# Patient Record
Sex: FEMALE | Race: WHITE | NOT HISPANIC OR LATINO
[De-identification: names, ages, dates, MRNs, and addresses within clinical notes are randomized per-mention and may not be internally consistent; named-entity substitution may affect disease eponyms.]

---

## 2017-03-14 ENCOUNTER — APPOINTMENT (OUTPATIENT)
Dept: OTOLARYNGOLOGY | Facility: AMBULATORY SURGERY CENTER | Age: 27
End: 2017-03-14

## 2019-02-05 ENCOUNTER — APPOINTMENT (OUTPATIENT)
Dept: OTOLARYNGOLOGY | Facility: CLINIC | Age: 29
End: 2019-02-05
Payer: COMMERCIAL

## 2019-02-05 VITALS
TEMPERATURE: 98.2 F | DIASTOLIC BLOOD PRESSURE: 60 MMHG | RESPIRATION RATE: 20 BRPM | SYSTOLIC BLOOD PRESSURE: 97 MMHG | OXYGEN SATURATION: 100 % | HEART RATE: 86 BPM

## 2019-02-05 PROCEDURE — 31231 NASAL ENDOSCOPY DX: CPT

## 2019-02-05 PROCEDURE — 99214 OFFICE O/P EST MOD 30 MIN: CPT | Mod: 25

## 2019-02-05 RX ORDER — OMEPRAZOLE 40 MG/1
40 CAPSULE, DELAYED RELEASE ORAL
Qty: 7 | Refills: 0 | Status: ACTIVE | COMMUNITY
Start: 2019-02-05 | End: 1900-01-01

## 2019-02-05 RX ORDER — METHYLPREDNISOLONE 4 MG/1
4 TABLET ORAL
Qty: 1 | Refills: 0 | Status: ACTIVE | COMMUNITY
Start: 2019-02-05 | End: 1900-01-01

## 2019-02-05 NOTE — PHYSICAL EXAM
[Nasal Endoscopy Performed] : nasal endoscopy was performed, see procedure section for findings [de-identified] : red [de-identified] : anteriorly,m but couldnot see posteriorly with speculum [Midline] : trachea located in midline position [Normal] : no rashes

## 2019-02-05 NOTE — HISTORY OF PRESENT ILLNESS
[de-identified] : 30 yo woman who has had fess with me in the past and h/o asthma and recurrent nasal polyps irrigates nose bid and has had episodic epsitaxis nasal congestion crusting and drainage for a few weeks./ -f.s.c feels as it did preop. Wants a new allergist. The drainage can be bloody and also yellow. it is severe.no fh related to cc

## 2019-02-05 NOTE — PROCEDURE
[Osteomeatal Pathology] : osteomeatal pathology [Epistaxis] : evaluation of epistaxis [Anterior rhinoscopy insufficient to account for symptoms] : anterior rhinoscopy insufficient to account for symptoms [Topical Lidocaine] : topical lidocaine [Oxymetazoline HCl] : oxymetazoline HCl [Flexible Endoscope] : examined with the flexible endoscope [Serial Number: ___] : Serial Number: [unfilled] [Audrey] : audrey [Red] : red [Nasal Mucosa] : bilateral purulence [Normal] : the septum showed no abnormalities [Paranasal Sinuses Middle Meatus] : bilateral purulence [___ cm] : bilateral [unfilled]Ucm polyp(s) [FreeTextEntry6] : yellow omu drainage with polyps in omus b

## 2019-02-21 ENCOUNTER — APPOINTMENT (OUTPATIENT)
Dept: OTOLARYNGOLOGY | Facility: CLINIC | Age: 29
End: 2019-02-21

## 2019-10-27 ENCOUNTER — FORM ENCOUNTER (OUTPATIENT)
Age: 29
End: 2019-10-27

## 2019-10-28 ENCOUNTER — FORM ENCOUNTER (OUTPATIENT)
Age: 29
End: 2019-10-28

## 2019-10-28 ENCOUNTER — APPOINTMENT (OUTPATIENT)
Dept: OBGYN | Facility: CLINIC | Age: 29
End: 2019-10-28
Payer: COMMERCIAL

## 2019-10-28 PROCEDURE — 36415 COLL VENOUS BLD VENIPUNCTURE: CPT

## 2019-10-28 PROCEDURE — 99202 OFFICE O/P NEW SF 15 MIN: CPT

## 2020-05-29 ENCOUNTER — APPOINTMENT (OUTPATIENT)
Dept: OBGYN | Facility: CLINIC | Age: 30
End: 2020-05-29
Payer: COMMERCIAL

## 2020-05-29 PROCEDURE — 99213 OFFICE O/P EST LOW 20 MIN: CPT

## 2020-06-22 ENCOUNTER — TRANSCRIPTION ENCOUNTER (OUTPATIENT)
Age: 30
End: 2020-06-22

## 2020-10-16 ENCOUNTER — APPOINTMENT (OUTPATIENT)
Dept: OBGYN | Facility: CLINIC | Age: 30
End: 2020-10-16
Payer: COMMERCIAL

## 2020-10-16 PROCEDURE — 99213 OFFICE O/P EST LOW 20 MIN: CPT

## 2020-10-30 ENCOUNTER — TRANSCRIPTION ENCOUNTER (OUTPATIENT)
Age: 30
End: 2020-10-30

## 2020-11-18 ENCOUNTER — TRANSCRIPTION ENCOUNTER (OUTPATIENT)
Age: 30
End: 2020-11-18

## 2020-12-08 ENCOUNTER — FORM ENCOUNTER (OUTPATIENT)
Age: 30
End: 2020-12-08

## 2020-12-08 ENCOUNTER — APPOINTMENT (OUTPATIENT)
Dept: OBGYN | Facility: CLINIC | Age: 30
End: 2020-12-08
Payer: COMMERCIAL

## 2020-12-08 PROCEDURE — 99212 OFFICE O/P EST SF 10 MIN: CPT | Mod: 95

## 2021-01-11 ENCOUNTER — FORM ENCOUNTER (OUTPATIENT)
Age: 31
End: 2021-01-11

## 2021-01-12 ENCOUNTER — APPOINTMENT (OUTPATIENT)
Dept: OBGYN | Facility: CLINIC | Age: 31
End: 2021-01-12
Payer: COMMERCIAL

## 2021-01-12 PROCEDURE — 76817 TRANSVAGINAL US OBSTETRIC: CPT

## 2021-01-12 PROCEDURE — 36415 COLL VENOUS BLD VENIPUNCTURE: CPT

## 2021-01-12 PROCEDURE — 99072 ADDL SUPL MATRL&STAF TM PHE: CPT

## 2021-01-12 PROCEDURE — 99213 OFFICE O/P EST LOW 20 MIN: CPT | Mod: 25

## 2021-01-12 PROCEDURE — 99395 PREV VISIT EST AGE 18-39: CPT

## 2021-01-28 ENCOUNTER — FORM ENCOUNTER (OUTPATIENT)
Age: 31
End: 2021-01-28

## 2021-02-09 ENCOUNTER — FORM ENCOUNTER (OUTPATIENT)
Age: 31
End: 2021-02-09

## 2021-02-10 ENCOUNTER — APPOINTMENT (OUTPATIENT)
Dept: OBGYN | Facility: CLINIC | Age: 31
End: 2021-02-10
Payer: COMMERCIAL

## 2021-02-10 PROCEDURE — 99072 ADDL SUPL MATRL&STAF TM PHE: CPT

## 2021-02-10 PROCEDURE — 76813 OB US NUCHAL MEAS 1 GEST: CPT

## 2021-02-10 PROCEDURE — 36415 COLL VENOUS BLD VENIPUNCTURE: CPT

## 2021-02-10 PROCEDURE — 0500F INITIAL PRENATAL CARE VISIT: CPT

## 2021-02-10 PROCEDURE — 76801 OB US < 14 WKS SINGLE FETUS: CPT | Mod: 59

## 2021-02-12 ENCOUNTER — FORM ENCOUNTER (OUTPATIENT)
Age: 31
End: 2021-02-12

## 2021-02-21 ENCOUNTER — FORM ENCOUNTER (OUTPATIENT)
Age: 31
End: 2021-02-21

## 2021-02-23 ENCOUNTER — OUTPATIENT (OUTPATIENT)
Dept: OUTPATIENT SERVICES | Facility: HOSPITAL | Age: 31
LOS: 1 days | Discharge: HOME | End: 2021-02-23
Payer: SELF-PAY

## 2021-02-24 PROCEDURE — 90792 PSYCH DIAG EVAL W/MED SRVCS: CPT | Mod: 95

## 2021-03-08 ENCOUNTER — APPOINTMENT (OUTPATIENT)
Dept: OBGYN | Facility: CLINIC | Age: 31
End: 2021-03-08
Payer: COMMERCIAL

## 2021-03-08 PROCEDURE — 36415 COLL VENOUS BLD VENIPUNCTURE: CPT

## 2021-03-08 PROCEDURE — 0502F SUBSEQUENT PRENATAL CARE: CPT

## 2021-03-16 PROCEDURE — 99214 OFFICE O/P EST MOD 30 MIN: CPT | Mod: 95

## 2021-04-02 ENCOUNTER — NON-APPOINTMENT (OUTPATIENT)
Age: 31
End: 2021-04-02

## 2021-04-02 ENCOUNTER — APPOINTMENT (OUTPATIENT)
Dept: ANTEPARTUM | Facility: CLINIC | Age: 31
End: 2021-04-02
Payer: COMMERCIAL

## 2021-04-02 ENCOUNTER — ASOB RESULT (OUTPATIENT)
Age: 31
End: 2021-04-02

## 2021-04-02 PROCEDURE — 76811 OB US DETAILED SNGL FETUS: CPT

## 2021-04-02 PROCEDURE — 99072 ADDL SUPL MATRL&STAF TM PHE: CPT

## 2021-04-06 ENCOUNTER — APPOINTMENT (OUTPATIENT)
Dept: OBGYN | Facility: CLINIC | Age: 31
End: 2021-04-06
Payer: COMMERCIAL

## 2021-04-06 DIAGNOSIS — F41.9 ANXIETY DISORDER, UNSPECIFIED: ICD-10-CM

## 2021-04-06 DIAGNOSIS — F31.81 BIPOLAR II DISORDER: ICD-10-CM

## 2021-04-06 PROCEDURE — 0502F SUBSEQUENT PRENATAL CARE: CPT

## 2021-04-07 PROCEDURE — 99214 OFFICE O/P EST MOD 30 MIN: CPT | Mod: 95

## 2021-04-16 DIAGNOSIS — F31.9 BIPOLAR DISORDER, UNSPECIFIED: ICD-10-CM

## 2021-05-05 PROCEDURE — 99214 OFFICE O/P EST MOD 30 MIN: CPT | Mod: 95

## 2021-05-18 ENCOUNTER — APPOINTMENT (OUTPATIENT)
Dept: OBGYN | Facility: CLINIC | Age: 31
End: 2021-05-18
Payer: COMMERCIAL

## 2021-05-18 ENCOUNTER — FORM ENCOUNTER (OUTPATIENT)
Age: 31
End: 2021-05-18

## 2021-05-18 PROCEDURE — 36415 COLL VENOUS BLD VENIPUNCTURE: CPT

## 2021-05-18 PROCEDURE — 0502F SUBSEQUENT PRENATAL CARE: CPT

## 2021-06-08 PROCEDURE — 99214 OFFICE O/P EST MOD 30 MIN: CPT | Mod: 95

## 2021-06-14 ENCOUNTER — APPOINTMENT (OUTPATIENT)
Dept: OBGYN | Facility: CLINIC | Age: 31
End: 2021-06-14
Payer: COMMERCIAL

## 2021-06-14 PROCEDURE — 0502F SUBSEQUENT PRENATAL CARE: CPT

## 2021-06-28 ENCOUNTER — FORM ENCOUNTER (OUTPATIENT)
Age: 31
End: 2021-06-28

## 2021-06-29 ENCOUNTER — APPOINTMENT (OUTPATIENT)
Dept: OBGYN | Facility: CLINIC | Age: 31
End: 2021-06-29
Payer: COMMERCIAL

## 2021-06-29 PROCEDURE — 76816 OB US FOLLOW-UP PER FETUS: CPT

## 2021-06-29 PROCEDURE — 90471 IMMUNIZATION ADMIN: CPT

## 2021-06-29 PROCEDURE — 99072 ADDL SUPL MATRL&STAF TM PHE: CPT

## 2021-06-29 PROCEDURE — 90715 TDAP VACCINE 7 YRS/> IM: CPT

## 2021-06-29 PROCEDURE — 76819 FETAL BIOPHYS PROFIL W/O NST: CPT

## 2021-07-06 PROCEDURE — 99214 OFFICE O/P EST MOD 30 MIN: CPT | Mod: 95

## 2021-07-09 PROCEDURE — 90792 PSYCH DIAG EVAL W/MED SRVCS: CPT | Mod: 95

## 2021-07-19 ENCOUNTER — APPOINTMENT (OUTPATIENT)
Dept: OBGYN | Facility: CLINIC | Age: 31
End: 2021-07-19
Payer: COMMERCIAL

## 2021-07-19 PROCEDURE — 0502F SUBSEQUENT PRENATAL CARE: CPT

## 2021-08-02 ENCOUNTER — APPOINTMENT (OUTPATIENT)
Dept: OBGYN | Facility: CLINIC | Age: 31
End: 2021-08-02
Payer: COMMERCIAL

## 2021-08-02 PROCEDURE — 0502F SUBSEQUENT PRENATAL CARE: CPT

## 2021-08-11 ENCOUNTER — FORM ENCOUNTER (OUTPATIENT)
Age: 31
End: 2021-08-11

## 2021-08-11 ENCOUNTER — APPOINTMENT (OUTPATIENT)
Dept: OBGYN | Facility: CLINIC | Age: 31
End: 2021-08-11

## 2021-08-12 ENCOUNTER — APPOINTMENT (OUTPATIENT)
Dept: OBGYN | Facility: CLINIC | Age: 31
End: 2021-08-12
Payer: COMMERCIAL

## 2021-08-12 PROCEDURE — 76819 FETAL BIOPHYS PROFIL W/O NST: CPT

## 2021-08-12 PROCEDURE — 36415 COLL VENOUS BLD VENIPUNCTURE: CPT

## 2021-08-12 PROCEDURE — 0502F SUBSEQUENT PRENATAL CARE: CPT

## 2021-08-12 PROCEDURE — 76816 OB US FOLLOW-UP PER FETUS: CPT

## 2021-08-16 ENCOUNTER — APPOINTMENT (OUTPATIENT)
Dept: OBGYN | Facility: CLINIC | Age: 31
End: 2021-08-16
Payer: COMMERCIAL

## 2021-08-16 PROCEDURE — 0502F SUBSEQUENT PRENATAL CARE: CPT

## 2021-08-17 ENCOUNTER — FORM ENCOUNTER (OUTPATIENT)
Age: 31
End: 2021-08-17

## 2021-08-21 ENCOUNTER — OUTPATIENT (OUTPATIENT)
Dept: OUTPATIENT SERVICES | Facility: HOSPITAL | Age: 31
LOS: 1 days | End: 2021-08-21
Payer: COMMERCIAL

## 2021-08-21 DIAGNOSIS — Z11.52 ENCOUNTER FOR SCREENING FOR COVID-19: ICD-10-CM

## 2021-08-21 LAB — SARS-COV-2 RNA SPEC QL NAA+PROBE: SIGNIFICANT CHANGE UP

## 2021-08-21 PROCEDURE — U0003: CPT

## 2021-08-21 PROCEDURE — C9803: CPT

## 2021-08-21 PROCEDURE — U0005: CPT

## 2021-08-22 ENCOUNTER — FORM ENCOUNTER (OUTPATIENT)
Age: 31
End: 2021-08-22

## 2021-08-23 ENCOUNTER — APPOINTMENT (OUTPATIENT)
Dept: OBGYN | Facility: CLINIC | Age: 31
End: 2021-08-23
Payer: COMMERCIAL

## 2021-08-23 PROCEDURE — 0502F SUBSEQUENT PRENATAL CARE: CPT

## 2021-08-23 PROCEDURE — 76818 FETAL BIOPHYS PROFILE W/NST: CPT

## 2021-08-24 ENCOUNTER — FORM ENCOUNTER (OUTPATIENT)
Age: 31
End: 2021-08-24

## 2021-08-24 ENCOUNTER — INPATIENT (INPATIENT)
Facility: HOSPITAL | Age: 31
LOS: 2 days | Discharge: HOME | End: 2021-08-27
Attending: STUDENT IN AN ORGANIZED HEALTH CARE EDUCATION/TRAINING PROGRAM | Admitting: STUDENT IN AN ORGANIZED HEALTH CARE EDUCATION/TRAINING PROGRAM
Payer: COMMERCIAL

## 2021-08-24 VITALS
DIASTOLIC BLOOD PRESSURE: 78 MMHG | HEART RATE: 100 BPM | WEIGHT: 138.01 LBS | RESPIRATION RATE: 17 BRPM | OXYGEN SATURATION: 100 % | HEIGHT: 60 IN | SYSTOLIC BLOOD PRESSURE: 112 MMHG | TEMPERATURE: 99 F

## 2021-08-24 DIAGNOSIS — O36.5930 MATERNAL CARE FOR OTHER KNOWN OR SUSPECTED POOR FETAL GROWTH, THIRD TRIMESTER, NOT APPLICABLE OR UNSPECIFIED: ICD-10-CM

## 2021-08-24 DIAGNOSIS — F31.9 BIPOLAR DISORDER, UNSPECIFIED: Chronic | ICD-10-CM

## 2021-08-24 DIAGNOSIS — Z98.890 OTHER SPECIFIED POSTPROCEDURAL STATES: Chronic | ICD-10-CM

## 2021-08-24 LAB
BASOPHILS # BLD AUTO: 0.05 K/UL — SIGNIFICANT CHANGE UP (ref 0–0.2)
BASOPHILS NFR BLD AUTO: 0.5 % — SIGNIFICANT CHANGE UP (ref 0–2)
BLD GP AB SCN SERPL QL: NEGATIVE — SIGNIFICANT CHANGE UP
EOSINOPHIL # BLD AUTO: 0.13 K/UL — SIGNIFICANT CHANGE UP (ref 0–0.5)
EOSINOPHIL NFR BLD AUTO: 1.3 % — SIGNIFICANT CHANGE UP (ref 0–6)
HCT VFR BLD CALC: 31.4 % — LOW (ref 34.5–45)
HGB BLD-MCNC: 10.8 G/DL — LOW (ref 11.5–15.5)
IMM GRANULOCYTES NFR BLD AUTO: 0.4 % — SIGNIFICANT CHANGE UP (ref 0–1.5)
LYMPHOCYTES # BLD AUTO: 2.1 K/UL — SIGNIFICANT CHANGE UP (ref 1–3.3)
LYMPHOCYTES # BLD AUTO: 20.9 % — SIGNIFICANT CHANGE UP (ref 13–44)
MCHC RBC-ENTMCNC: 31.3 PG — SIGNIFICANT CHANGE UP (ref 27–34)
MCHC RBC-ENTMCNC: 34.4 GM/DL — SIGNIFICANT CHANGE UP (ref 32–36)
MCV RBC AUTO: 91 FL — SIGNIFICANT CHANGE UP (ref 80–100)
MONOCYTES # BLD AUTO: 0.57 K/UL — SIGNIFICANT CHANGE UP (ref 0–0.9)
MONOCYTES NFR BLD AUTO: 5.7 % — SIGNIFICANT CHANGE UP (ref 2–14)
NEUTROPHILS # BLD AUTO: 7.16 K/UL — SIGNIFICANT CHANGE UP (ref 1.8–7.4)
NEUTROPHILS NFR BLD AUTO: 71.2 % — SIGNIFICANT CHANGE UP (ref 43–77)
NRBC # BLD: 0 /100 WBCS — SIGNIFICANT CHANGE UP (ref 0–0)
PLATELET # BLD AUTO: 319 K/UL — SIGNIFICANT CHANGE UP (ref 150–400)
RBC # BLD: 3.45 M/UL — LOW (ref 3.8–5.2)
RBC # FLD: 12.6 % — SIGNIFICANT CHANGE UP (ref 10.3–14.5)
RH IG SCN BLD-IMP: POSITIVE — SIGNIFICANT CHANGE UP
WBC # BLD: 10.05 K/UL — SIGNIFICANT CHANGE UP (ref 3.8–10.5)
WBC # FLD AUTO: 10.05 K/UL — SIGNIFICANT CHANGE UP (ref 3.8–10.5)

## 2021-08-24 RX ORDER — SODIUM CHLORIDE 9 MG/ML
1000 INJECTION, SOLUTION INTRAVENOUS
Refills: 0 | Status: DISCONTINUED | OUTPATIENT
Start: 2021-08-24 | End: 2021-08-25

## 2021-08-24 RX ORDER — SODIUM CHLORIDE 9 MG/ML
1000 INJECTION, SOLUTION INTRAVENOUS
Refills: 0 | Status: DISCONTINUED | OUTPATIENT
Start: 2021-08-24 | End: 2021-08-27

## 2021-08-24 RX ORDER — SODIUM CHLORIDE 9 MG/ML
1000 INJECTION, SOLUTION INTRAVENOUS ONCE
Refills: 0 | Status: COMPLETED | OUTPATIENT
Start: 2021-08-24 | End: 2021-08-24

## 2021-08-24 RX ORDER — CITRIC ACID/SODIUM CITRATE 300-500 MG
15 SOLUTION, ORAL ORAL EVERY 6 HOURS
Refills: 0 | Status: DISCONTINUED | OUTPATIENT
Start: 2021-08-24 | End: 2021-08-25

## 2021-08-24 RX ORDER — OXYTOCIN 10 UNIT/ML
333.33 VIAL (ML) INJECTION
Qty: 20 | Refills: 0 | Status: DISCONTINUED | OUTPATIENT
Start: 2021-08-24 | End: 2021-08-25

## 2021-08-24 RX ADMIN — SODIUM CHLORIDE 1000 MILLILITER(S): 9 INJECTION, SOLUTION INTRAVENOUS at 23:23

## 2021-08-24 RX ADMIN — SODIUM CHLORIDE 125 MILLILITER(S): 9 INJECTION, SOLUTION INTRAVENOUS at 23:23

## 2021-08-24 NOTE — OB PROVIDER H&P - NSICDXPASTMEDICALHX_GEN_ALL_CORE_FT
PAST MEDICAL HISTORY:  Asthma takes albuterol as needed last used 1 day ago    H/O Meniere's disease     Sinusitis

## 2021-08-24 NOTE — OB PROVIDER H&P - ASSESSMENT
30yo  @40+2 here for eIOL fidel q5-6 min. Admit to L&D and start on po cytotec and plan for CB.    Plan  - Admit to LND. Routine Labs. IVF.  - IOL w/ po cytotec. CB @2-3 am  - Fetus: cat 145 tracing. VTX. EFW extrapolated to 3200g by  sono. Continuous EFM. No concerns.  - Prenatal issues: none  - GBS neg, abx not required  - Pain: epidural PRN    Patient discussed with attending physician, Dr. Durand.

## 2021-08-24 NOTE — OB PROVIDER H&P - NS_OBGYNHISTORY_OBGYN_ALL_OB_FT
Ob: Primigravida  Gyn: Hx of ovarian cysts. Denies fibroids, PCOS, endometriosis, abnormal paps, STIs

## 2021-08-24 NOTE — OB RN PATIENT PROFILE - NSICDXPASTMEDICALHX_GEN_ALL_CORE_FT
PAST MEDICAL HISTORY:  Asthma takes albuterol as needed last attack 1 month ago    H/O Meniere's disease     Sinusitis

## 2021-08-24 NOTE — OB RN PATIENT PROFILE - NSICDXPASTSURGICALHX_GEN_ALL_CORE_FT
PAST SURGICAL HISTORY:  Bipolar disorder sees therapist with [facility], takes buproprion 100 mg XR and lamictal 150 mg once daily    H/O nasal polypectomy     Status post breast reduction

## 2021-08-24 NOTE — OB PROVIDER H&P - NS_CONSENTSAPP_OBGYN_ALL_OB
Anesthesia ROS/MED HX    Anesthesia History - neg  Pulmonary - neg  Neuro/Psych - neg  Cardiovascular- neg  Hematological - neg  GI/Hepatic- neg  Musculoskeletal- neg  Renal Disease- neg  Endo/Other  History of cancer and colon cancer  Body Habitus: Overweight      Past Surgical History:   Procedure Laterality Date   •  SECTION     • COLON SURGERY  05/15/2013    Colonoscopy to the base of the cecum. Limited ileoscopy, biopsy, flat area of the base of the cecum.    • COLON SURGERY  2015    Colonoscopy to the base of the cecum, normal anastomosis.   • COLON SURGERY  2011    Colonoscopy to the base of the cecum   • COLON SURGERY  07/15/2009    Colonoscopy to the base of the cecum   • COLON SURGERY  2008    Colonoscopy to the base of the cecum   • COLON SURGERY  2008    Exploratory laparoscopy with laparoscopic Tequila's resection, takedown of the splenic flexure, resection of residual sigmoid colon,portion of rectum with descending colorectal anastomosis.    • COLON SURGERY  2007    Total colonoscopy via stoma and flexible sigmoidoscopy per rectum with cold forceps biopsy.    • COLON SURGERY  2017    Colonoscopy to the base of the cecum       Physical Exam    Airway   Mallampati: III   TM distance: >3 FB   Neck ROM: full  Cardiovascular - normal   Rhythm: regular   Rate: normal  Pulmonary - normal   clear to auscultation        Anesthesia Plan    Plan: MAC   ASA 3     No

## 2021-08-24 NOTE — OB PROVIDER H&P - NSHPPHYSICALEXAM_GEN_ALL_CORE
Gen: NAD, very uncomfortable during contractions  CV: RRR  Pulm: breathing comfortably on RA  Abd: gravid, nontender  Extr: moving all extremities with ease  – VE: 1/80/-3  – FHT Cat I: baseline 145, mod variability, +accels, -decels  – [place]: q5-6 min  – Sono: vertex      No prenatal issues. GBS _.    Plan  - Admit to LND. Routine Labs. IVF.  - Expectant management/IOL w/  - Fetus: cat 1 tracing. VTX. EFW g by sono. Continuous EFM. No concerns.  - Prenatal issues: none  - GBS   - Pain: epidural PRN    Patient discussed with attending physician,  . Gen: NAD, very uncomfortable during contractions  CV: RRR  Pulm: breathing comfortably on RA  Abd: gravid, nontender  Extr: moving all extremities with ease  – VE: 1/80/-3  – FHT Cat I: baseline 145, mod variability, +accels, -decels  – [place]: q5-6 min  – Sono: vertex

## 2021-08-24 NOTE — OB PROVIDER H&P - HISTORY OF PRESENT ILLNESS
HPI: 32yo F  @40+2 presents for eIOL. +FM. -LOF. -CTXs. -VB. Pt denies any other concerns.    PNC: Denies prenatal issues. GBS neg.  EFW 2800g by  shivam.    OBHx: primigravida  GynHx: denies hx STIs, fibroids, polyps, cysts  PMH: Asthma-on albuterol, last used yesterday. Never hospitalized or intubated. Denies hx clotting or bleeding disorders, HTN, DM  PSH: breast reduction, sinus sgy  PFH: no hx congential disorders, bleeding/clotting disorders  Psych: Bipolar Disorder and depression  Social: denies etoh, smoking, drugs. Safe at home/in relationship.  Meds: PNV   Allergies: NKDA  Will accept blood transfusions? Yes       HPI: 32yo F  @40+2 presents for eIOL. +FM. -LOF. -CTXs. -VB. Pt denies any other concerns.    PNC: Denies prenatal issues. GBS neg.  EFW 2800g by  shivam.    OBHx: primigravida  GynHx: denies hx STIs, fibroids, polyps, cysts  PMH: Asthma-on albuterol, last used yesterday. Never hospitalized or intubated. Denies hx clotting or bleeding disorders, HTN, DM  PSH: breast reduction, sinus sgy  PFH: no hx congential disorders, bleeding/clotting disorders  Psych: Bipolar Disorder and depression  Social: denies etoh, smoking, drugs. Safe at home/in relationship.  Meds: PNV   Allergies: NKDA  Will accept blood transfusions? Yes HPI: 30yo F  @40+2 presents for eIOL. +FM. -LOF. -VB. Pt denies any other concerns.    PNC: Denies prenatal issues. GBS neg.  EFW 2800g by  sono.    OBHx: primigravida  GynHx: Had ovarian cysts. Denies hx STIs, fibroids, PCOS, endometriosis, polyps, STIs, abnormal paps  PMH: Asthma-on albuterol, last used yesterday. Never hospitalized or intubated. Meniere's disease, HA. Denies hx clotting or bleeding disorders, HTN, DM  PSH: breast reduction, sinus sgy  PFH: no hx congential disorders, bleeding/clotting disorders  Psych: Bipolar Disorder and depression  Social: denies etoh, smoking, drugs. Safe at home/in relationship.  Meds: Lamictal 150mg qd, Bupropion 150 ER qd, PNV, Unasom, Mg  Allergies: NKDA  Will accept blood transfusions? Yes HPI: 32yo F  @40+2 presents for eIOL. +FM. -LOF. -VB. Pt denies any other concerns.    PNC: Denies prenatal issues. GBS neg.  EFW 2800g by  sono.    Patient has a history of Bipolar Disorder and takes Lamictal 150 and a history of depression and takes Bupropion 150 ER. She follows with Dr[facility] Renee at [facility].    OBHx: primigravida  GynHx: Had ovarian cysts. Denies hx STIs, fibroids, PCOS, endometriosis, polyps, STIs, abnormal paps  PMH: Asthma-on albuterol, last used yesterday. Never hospitalized or intubated. Meniere's disease, HA. Denies hx clotting or bleeding disorders, HTN, DM  PSH: breast reduction, sinus sgy  PFH: no hx congential disorders, bleeding/clotting disorders  Psych: Bipolar Disorder and depression.  Social: denies etoh, smoking, drugs. Safe at home/in relationship.  Meds: Lamictal 150mg qd, Bupropion 150 ER qd, PNV, Unasom, Mg  Allergies: NKDA  Will accept blood transfusions? Yes

## 2021-08-25 ENCOUNTER — FORM ENCOUNTER (OUTPATIENT)
Age: 31
End: 2021-08-25

## 2021-08-25 LAB
COVID-19 SPIKE DOMAIN AB INTERP: POSITIVE
COVID-19 SPIKE DOMAIN ANTIBODY RESULT: >250 U/ML — HIGH
SARS-COV-2 IGG+IGM SERPL QL IA: >250 U/ML — HIGH
SARS-COV-2 IGG+IGM SERPL QL IA: POSITIVE
T PALLIDUM AB TITR SER: NEGATIVE — SIGNIFICANT CHANGE UP

## 2021-08-25 PROCEDURE — 59400 OBSTETRICAL CARE: CPT

## 2021-08-25 RX ORDER — IBUPROFEN 200 MG
600 TABLET ORAL EVERY 6 HOURS
Refills: 0 | Status: DISCONTINUED | OUTPATIENT
Start: 2021-08-25 | End: 2021-08-25

## 2021-08-25 RX ORDER — DIBUCAINE 1 %
1 OINTMENT (GRAM) RECTAL EVERY 6 HOURS
Refills: 0 | Status: DISCONTINUED | OUTPATIENT
Start: 2021-08-25 | End: 2021-08-27

## 2021-08-25 RX ORDER — MAGNESIUM HYDROXIDE 400 MG/1
30 TABLET, CHEWABLE ORAL
Refills: 0 | Status: DISCONTINUED | OUTPATIENT
Start: 2021-08-25 | End: 2021-08-27

## 2021-08-25 RX ORDER — OXYTOCIN 10 UNIT/ML
333.33 VIAL (ML) INJECTION
Qty: 20 | Refills: 0 | Status: DISCONTINUED | OUTPATIENT
Start: 2021-08-25 | End: 2021-08-27

## 2021-08-25 RX ORDER — OXYTOCIN 10 UNIT/ML
4 VIAL (ML) INJECTION
Qty: 30 | Refills: 0 | Status: DISCONTINUED | OUTPATIENT
Start: 2021-08-25 | End: 2021-08-27

## 2021-08-25 RX ORDER — DIPHENHYDRAMINE HCL 50 MG
25 CAPSULE ORAL EVERY 6 HOURS
Refills: 0 | Status: DISCONTINUED | OUTPATIENT
Start: 2021-08-25 | End: 2021-08-27

## 2021-08-25 RX ORDER — HYDROCORTISONE 1 %
1 OINTMENT (GRAM) TOPICAL EVERY 6 HOURS
Refills: 0 | Status: DISCONTINUED | OUTPATIENT
Start: 2021-08-25 | End: 2021-08-27

## 2021-08-25 RX ORDER — TETANUS TOXOID, REDUCED DIPHTHERIA TOXOID AND ACELLULAR PERTUSSIS VACCINE, ADSORBED 5; 2.5; 8; 8; 2.5 [IU]/.5ML; [IU]/.5ML; UG/.5ML; UG/.5ML; UG/.5ML
0.5 SUSPENSION INTRAMUSCULAR ONCE
Refills: 0 | Status: DISCONTINUED | OUTPATIENT
Start: 2021-08-25 | End: 2021-08-27

## 2021-08-25 RX ORDER — BENZOCAINE 10 %
1 GEL (GRAM) MUCOUS MEMBRANE EVERY 6 HOURS
Refills: 0 | Status: DISCONTINUED | OUTPATIENT
Start: 2021-08-25 | End: 2021-08-27

## 2021-08-25 RX ORDER — AER TRAVELER 0.5 G/1
1 SOLUTION RECTAL; TOPICAL EVERY 4 HOURS
Refills: 0 | Status: DISCONTINUED | OUTPATIENT
Start: 2021-08-25 | End: 2021-08-27

## 2021-08-25 RX ORDER — OXYCODONE HYDROCHLORIDE 5 MG/1
5 TABLET ORAL ONCE
Refills: 0 | Status: DISCONTINUED | OUTPATIENT
Start: 2021-08-25 | End: 2021-08-27

## 2021-08-25 RX ORDER — OXYCODONE HYDROCHLORIDE 5 MG/1
5 TABLET ORAL
Refills: 0 | Status: DISCONTINUED | OUTPATIENT
Start: 2021-08-25 | End: 2021-08-27

## 2021-08-25 RX ORDER — SIMETHICONE 80 MG/1
80 TABLET, CHEWABLE ORAL EVERY 4 HOURS
Refills: 0 | Status: DISCONTINUED | OUTPATIENT
Start: 2021-08-25 | End: 2021-08-27

## 2021-08-25 RX ORDER — SODIUM CHLORIDE 9 MG/ML
3 INJECTION INTRAMUSCULAR; INTRAVENOUS; SUBCUTANEOUS EVERY 8 HOURS
Refills: 0 | Status: DISCONTINUED | OUTPATIENT
Start: 2021-08-25 | End: 2021-08-27

## 2021-08-25 RX ORDER — PRAMOXINE HYDROCHLORIDE 150 MG/15G
1 AEROSOL, FOAM RECTAL EVERY 4 HOURS
Refills: 0 | Status: DISCONTINUED | OUTPATIENT
Start: 2021-08-25 | End: 2021-08-27

## 2021-08-25 RX ORDER — IBUPROFEN 200 MG
600 TABLET ORAL EVERY 6 HOURS
Refills: 0 | Status: DISCONTINUED | OUTPATIENT
Start: 2021-08-25 | End: 2021-08-27

## 2021-08-25 RX ORDER — ACETAMINOPHEN 500 MG
975 TABLET ORAL
Refills: 0 | Status: DISCONTINUED | OUTPATIENT
Start: 2021-08-25 | End: 2021-08-27

## 2021-08-25 RX ORDER — LAMOTRIGINE 25 MG/1
150 TABLET, ORALLY DISINTEGRATING ORAL DAILY
Refills: 0 | Status: DISCONTINUED | OUTPATIENT
Start: 2021-08-25 | End: 2021-08-27

## 2021-08-25 RX ORDER — KETOROLAC TROMETHAMINE 30 MG/ML
30 SYRINGE (ML) INJECTION ONCE
Refills: 0 | Status: DISCONTINUED | OUTPATIENT
Start: 2021-08-25 | End: 2021-08-25

## 2021-08-25 RX ORDER — BUPROPION HYDROCHLORIDE 150 MG/1
150 TABLET, EXTENDED RELEASE ORAL DAILY
Refills: 0 | Status: DISCONTINUED | OUTPATIENT
Start: 2021-08-25 | End: 2021-08-27

## 2021-08-25 RX ORDER — ALBUTEROL 90 UG/1
2 AEROSOL, METERED ORAL EVERY 6 HOURS
Refills: 0 | Status: DISCONTINUED | OUTPATIENT
Start: 2021-08-25 | End: 2021-08-27

## 2021-08-25 RX ORDER — LANOLIN
1 OINTMENT (GRAM) TOPICAL EVERY 6 HOURS
Refills: 0 | Status: DISCONTINUED | OUTPATIENT
Start: 2021-08-25 | End: 2021-08-27

## 2021-08-25 RX ADMIN — BUPROPION HYDROCHLORIDE 150 MILLIGRAM(S): 150 TABLET, EXTENDED RELEASE ORAL at 08:49

## 2021-08-25 RX ADMIN — LAMOTRIGINE 150 MILLIGRAM(S): 25 TABLET, ORALLY DISINTEGRATING ORAL at 08:49

## 2021-08-25 RX ADMIN — Medication 30 MILLIGRAM(S): at 15:57

## 2021-08-25 RX ADMIN — Medication 975 MILLIGRAM(S): at 21:45

## 2021-08-25 RX ADMIN — Medication 4 MILLIUNIT(S)/MIN: at 08:38

## 2021-08-25 RX ADMIN — Medication 975 MILLIGRAM(S): at 21:11

## 2021-08-25 RX ADMIN — Medication 1000 MILLIUNIT(S)/MIN: at 16:25

## 2021-08-25 NOTE — OB PROVIDER LABOR PROGRESS NOTE - NS_SUBJECTIVE/OBJECTIVE_OBGYN_ALL_OB_FT
PA Note:  Patient seen and evaluated at bedside for placement of cervical balloon. Patient comfortable with epidural in place.    Cervical balloon placed without incident. 60cc of NS filled each uterine and vaginal balloon
Pt uncomfortable, desires more pain medication. Pt examined, Cook balloon out

## 2021-08-25 NOTE — OB PROVIDER DELIVERY SUMMARY - NSPROVIDERDELIVERYNOTE_OBGYN_ALL_OB_FT
Spontaneous vaginal delivery of liveborn infant from [place]. Head, shoulders and body delivered with the aid of a medline episiotomy. Infant was suctioned and handed off to mother/peds. Placenta delivered intact with a 3 vessel cord. Fundal massage was given and uterine fundus was found to be firm. Midline episiotomy was repaired with a 2.0 chromic. Skin was reapproximated with 3.0 chromic. Vaginal exam revealed an intact cervix, vaginal walls and sulci.  Excellent hemostasis noted. Patient was stable. Count was correct x2. APGAR 9/9. Spontaneous vaginal delivery of liveborn infant from [place]. Head, shoulders and body delivered with the aid of a midline episiotomy. Infant was suctioned and handed off to mother, delayed cord clamping performed after 1 minute then infant handed to peds. Placenta delivered intact with a 3 vessel cord. Fundal massage was given and uterine fundus was found to be firm. Midline episiotomy was repaired with a 2.0 chromic. Skin was reapproximated with 3.0 chromic. Vaginal exam revealed an intact cervix, vaginal walls and sulci.  Excellent hemostasis noted. Patient was stable. Count was correct x2. APGAR 9/9.

## 2021-08-25 NOTE — OB PROVIDER DELIVERY SUMMARY - NSSELHIDDEN_OBGYN_ALL_OB_FT
[NS_DeliveryAttending1_OBGYN_ALL_OB_FT:[id number]==],[NS_DeliveryAssist1_OBGYN_ALL_OB_FT:[id number]]

## 2021-08-25 NOTE — OB PROVIDER LABOR PROGRESS NOTE - ASSESSMENT
32yo  @40+2  eIOL.   Anesthesia called for top-off  C/W Pitocin for IOL  Will update Dr. Durand
A/P:  -EFM/[place]  -Continue IOL with CB and PO  -Anticipate   Plan per Dr. Ladarius Guardado PA-C

## 2021-08-25 NOTE — OB RN DELIVERY SUMMARY - NSSELHIDDEN_OBGYN_ALL_OB_FT
[NS_DeliveryAttending1_OBGYN_ALL_OB_FT:[id number]==],[NS_DeliveryAssist1_OBGYN_ALL_OB_FT:[id number]=],[NS_DeliveryRN_OBGYN_ALL_OB_FT:[id number]==]

## 2021-08-25 NOTE — OB RN DELIVERY SUMMARY - NS_SEPSISRSKCALC_OBGYN_ALL_OB_FT
EOS calculated successfully. EOS Risk Factor: 0.5/1000 live births ([facility] national incidence); GA=40w3d; Temp=98.8; ROM=4.867; GBS='Negative'; Antibiotics='No antibiotics or any antibiotics < 2 hrs prior to birth'

## 2021-08-26 ENCOUNTER — TRANSCRIPTION ENCOUNTER (OUTPATIENT)
Age: 31
End: 2021-08-26

## 2021-08-26 PROBLEM — Z86.69 PERSONAL HISTORY OF OTHER DISEASES OF THE NERVOUS SYSTEM AND SENSE ORGANS: Chronic | Status: ACTIVE | Noted: 2021-08-24

## 2021-08-26 PROBLEM — J45.909 UNSPECIFIED ASTHMA, UNCOMPLICATED: Chronic | Status: ACTIVE | Noted: 2021-08-24

## 2021-08-26 PROBLEM — J32.9 CHRONIC SINUSITIS, UNSPECIFIED: Chronic | Status: ACTIVE | Noted: 2021-08-24

## 2021-08-26 RX ORDER — ACETAMINOPHEN 500 MG
3 TABLET ORAL
Qty: 0 | Refills: 0 | DISCHARGE
Start: 2021-08-26

## 2021-08-26 RX ORDER — IBUPROFEN 200 MG
1 TABLET ORAL
Qty: 0 | Refills: 0 | DISCHARGE
Start: 2021-08-26

## 2021-08-26 RX ORDER — ALBUTEROL 90 UG/1
2 AEROSOL, METERED ORAL
Qty: 0 | Refills: 0 | DISCHARGE
Start: 2021-08-26

## 2021-08-26 RX ADMIN — BUPROPION HYDROCHLORIDE 150 MILLIGRAM(S): 150 TABLET, EXTENDED RELEASE ORAL at 12:40

## 2021-08-26 RX ADMIN — Medication 600 MILLIGRAM(S): at 12:41

## 2021-08-26 RX ADMIN — Medication 600 MILLIGRAM(S): at 00:09

## 2021-08-26 RX ADMIN — Medication 600 MILLIGRAM(S): at 21:52

## 2021-08-26 RX ADMIN — Medication 600 MILLIGRAM(S): at 21:16

## 2021-08-26 RX ADMIN — Medication 600 MILLIGRAM(S): at 13:10

## 2021-08-26 RX ADMIN — Medication 600 MILLIGRAM(S): at 07:20

## 2021-08-26 RX ADMIN — Medication 1 TABLET(S): at 12:43

## 2021-08-26 RX ADMIN — LAMOTRIGINE 150 MILLIGRAM(S): 25 TABLET, ORALLY DISINTEGRATING ORAL at 12:40

## 2021-08-26 RX ADMIN — Medication 600 MILLIGRAM(S): at 06:28

## 2021-08-26 RX ADMIN — Medication 975 MILLIGRAM(S): at 05:00

## 2021-08-26 NOTE — DISCHARGE NOTE OB - HOSPITAL COURSE
Pt admitted for elective induction at 40 weeks. Pt had uncomplicated labor and delivery. She had an uncomplicated postpartum course. On postpartum day 1 patient met criteria for discharge home. PT to follow up in the office in 6 weeks.

## 2021-08-26 NOTE — DISCHARGE NOTE OB - CARE PROVIDER_API CALL
Arielle Ruelas)  OBSGYN  General [address] [address]  Phone: [phone]  Fax: [phone]  Follow Up Time:

## 2021-08-26 NOTE — DISCHARGE NOTE OB - MEDICATION SUMMARY - MEDICATIONS TO TAKE
I will START or STAY ON the medications listed below when I get home from the hospital:    acetaminophen 325 mg oral tablet  -- 3 tab(s) by mouth   -- Indication: For pain    ibuprofen 600 mg oral tablet  -- 1 tab(s) by mouth every 6 hours, As needed, Moderate Pain (4 - 6), Severe Pain (7 - 10)  -- Indication: For pain    LaMICtal 150 mg oral tablet  -- 1 tab(s) by mouth once a day  -- Indication: For depression    albuterol 90 mcg/inh inhalation aerosol  -- 2 puff(s) inhaled every 6 hours, As needed, Shortness of Breath and/or Wheezing  -- Indication: For Asthma    Prenatal Multivitamins with Folic Acid 1 mg oral tablet  -- 1 tab(s) by mouth once a day  -- Indication: For Nutritional    buPROPion 150 mg/24 hours (XL) oral tablet, extended release  -- 1 tab(s) by mouth every 24 hours  -- Indication: For depression

## 2021-08-26 NOTE — DISCHARGE NOTE OB - PATIENT PORTAL LINK FT
You can access the FollowMyHealth Patient Portal offered by [facility] by registering at the following website: [email or web address]. By joining [email or web address]’s FollowMyHealth portal, you will also be able to view your health information using other applications (apps) compatible with our system.

## 2021-08-26 NOTE — DISCHARGE NOTE OB - CARE PLAN
1 Principal Discharge DX:	Vaginal delivery  Assessment and plan of treatment:	Postpartum care. Follow up in the office in 6 weeks. Call to schedule an appointment.

## 2021-08-26 NOTE — PROGRESS NOTE ADULT - SUBJECTIVE AND OBJECTIVE BOX
PA PPD#1  Note    Blood Type:  A+          Rubella:   Immune             RPR:  NR  Pain:  Controlled  Complaints:  None  Pt. is OOB, voiding, passing flatus, & tolerating regular diet.  Lochia:  WNL    VS:  Vital Signs Last 24 Hrs  T(C): 36.6 (26 Aug 2021 05:22), Max: 37.1 (25 Aug 2021 15:47)  T(F): 97.8 (26 Aug 2021 05:22), Max: 98.8 (25 Aug 2021 15:47)  HR: 87 (26 Aug 2021 05:22) (65 - 109)  BP: 117/78 (26 Aug 2021 05:22) (97/65 - 135/73)  RR: 18 (26 Aug 2021 05:22) (16 - 18)  SpO2: 100% (26 Aug 2021 05:22) (78% - 100%)                        10.8   10.05 )-----------( 319      ( 24 Aug 2021 22:24 )             31.4     Abd:  Soft, non-distended, non-tender            Fundus-firm, NT  Median Episiotomy: C/D/I  Extremities:  Edema - none                     Calf Tenderness - none    Assessment:    31 y.o. G 1  P 1001      PPD # 1 S/P  in stable condition.  PMHx significant for:  Sinus Surgery, Breast Reduction, Asthma, Bipolar Disease, Meniere's dis  Current Issues:  None  Plan:  Increase OOB             Cont. Pain Protocol             Cont. Reg. Diet             Cont. PP Care.             SW Cosnult             Cont. Albuteral prn             Cont. Lamictal & Wellbutrin                  JUDAH Belcher

## 2021-08-27 VITALS
RESPIRATION RATE: 18 BRPM | HEART RATE: 67 BPM | OXYGEN SATURATION: 98 % | TEMPERATURE: 99 F | DIASTOLIC BLOOD PRESSURE: 77 MMHG | SYSTOLIC BLOOD PRESSURE: 118 MMHG

## 2021-08-27 PROCEDURE — 86850 RBC ANTIBODY SCREEN: CPT

## 2021-08-27 PROCEDURE — 86900 BLOOD TYPING SEROLOGIC ABO: CPT

## 2021-08-27 PROCEDURE — 86769 SARS-COV-2 COVID-19 ANTIBODY: CPT

## 2021-08-27 PROCEDURE — 59025 FETAL NON-STRESS TEST: CPT

## 2021-08-27 PROCEDURE — 85025 COMPLETE CBC W/AUTO DIFF WBC: CPT

## 2021-08-27 PROCEDURE — 86901 BLOOD TYPING SEROLOGIC RH(D): CPT

## 2021-08-27 PROCEDURE — 86780 TREPONEMA PALLIDUM: CPT

## 2021-08-27 PROCEDURE — 59050 FETAL MONITOR W/REPORT: CPT

## 2021-08-27 RX ADMIN — Medication 975 MILLIGRAM(S): at 11:53

## 2021-08-27 RX ADMIN — Medication 600 MILLIGRAM(S): at 07:54

## 2021-08-31 PROCEDURE — 99214 OFFICE O/P EST MOD 30 MIN: CPT | Mod: 95

## 2021-10-05 PROCEDURE — 99214 OFFICE O/P EST MOD 30 MIN: CPT | Mod: 95

## 2021-10-06 ENCOUNTER — APPOINTMENT (OUTPATIENT)
Dept: OBGYN | Facility: CLINIC | Age: 31
End: 2021-10-06

## 2021-10-11 ENCOUNTER — FORM ENCOUNTER (OUTPATIENT)
Age: 31
End: 2021-10-11

## 2021-10-12 ENCOUNTER — APPOINTMENT (OUTPATIENT)
Dept: OBGYN | Facility: CLINIC | Age: 31
End: 2021-10-12
Payer: COMMERCIAL

## 2021-10-12 VITALS
SYSTOLIC BLOOD PRESSURE: 110 MMHG | BODY MASS INDEX: 23.16 KG/M2 | DIASTOLIC BLOOD PRESSURE: 76 MMHG | WEIGHT: 118 LBS | HEIGHT: 60 IN

## 2021-10-12 PROCEDURE — 0503F POSTPARTUM CARE VISIT: CPT

## 2021-11-04 PROCEDURE — 99214 OFFICE O/P EST MOD 30 MIN: CPT | Mod: 95

## 2021-11-16 PROCEDURE — 90834 PSYTX W PT 45 MINUTES: CPT | Mod: 95

## 2021-11-23 PROCEDURE — 90834 PSYTX W PT 45 MINUTES: CPT | Mod: 95

## 2021-12-07 DIAGNOSIS — F31.81 BIPOLAR II DISORDER: ICD-10-CM

## 2021-12-07 DIAGNOSIS — Z13.79 ENCOUNTER FOR OTHER SCREENING FOR GENETIC AND CHROMOSOMAL ANOMALIES: ICD-10-CM

## 2021-12-07 DIAGNOSIS — F32.A DEPRESSION, UNSPECIFIED: ICD-10-CM

## 2021-12-07 DIAGNOSIS — A59.01 TRICHOMONAL VULVOVAGINITIS: ICD-10-CM

## 2021-12-07 PROCEDURE — 90834 PSYTX W PT 45 MINUTES: CPT | Mod: 95

## 2021-12-07 RX ORDER — LAMOTRIGINE 25 MG/1
TABLET ORAL
Refills: 0 | Status: ACTIVE | COMMUNITY

## 2021-12-07 RX ORDER — FLUTICASONE PROPIONATE 50 UG/1
SPRAY, METERED NASAL
Refills: 0 | Status: ACTIVE | COMMUNITY

## 2021-12-07 RX ORDER — ALBUTEROL 90 MCG
AEROSOL (GRAM) INHALATION
Refills: 0 | Status: ACTIVE | COMMUNITY

## 2021-12-07 RX ORDER — PNV NO.95/FERROUS FUM/FOLIC AC 28MG-0.8MG
TABLET ORAL
Refills: 0 | Status: ACTIVE | COMMUNITY

## 2021-12-07 RX ORDER — BUPROPION HYDROCHLORIDE 100 MG/1
TABLET, FILM COATED ORAL
Refills: 0 | Status: ACTIVE | COMMUNITY

## 2021-12-14 PROCEDURE — 90834 PSYTX W PT 45 MINUTES: CPT | Mod: 95

## 2022-01-11 PROCEDURE — 90834 PSYTX W PT 45 MINUTES: CPT | Mod: 95

## 2022-01-18 PROCEDURE — 90834 PSYTX W PT 45 MINUTES: CPT | Mod: 95

## 2022-01-21 PROCEDURE — 99214 OFFICE O/P EST MOD 30 MIN: CPT | Mod: 95

## 2022-01-25 PROCEDURE — 90834 PSYTX W PT 45 MINUTES: CPT | Mod: 95

## 2022-02-04 PROCEDURE — 90834 PSYTX W PT 45 MINUTES: CPT

## 2022-02-18 PROCEDURE — 90834 PSYTX W PT 45 MINUTES: CPT | Mod: 95

## 2022-03-18 PROCEDURE — 99214 OFFICE O/P EST MOD 30 MIN: CPT | Mod: 95

## 2022-03-18 PROCEDURE — 90834 PSYTX W PT 45 MINUTES: CPT | Mod: 95

## 2022-03-21 ENCOUNTER — APPOINTMENT (OUTPATIENT)
Dept: OBGYN | Facility: CLINIC | Age: 32
End: 2022-03-21
Payer: COMMERCIAL

## 2022-03-21 VITALS
HEIGHT: 60 IN | SYSTOLIC BLOOD PRESSURE: 90 MMHG | DIASTOLIC BLOOD PRESSURE: 60 MMHG | BODY MASS INDEX: 22.38 KG/M2 | WEIGHT: 114 LBS

## 2022-03-21 PROCEDURE — 99395 PREV VISIT EST AGE 18-39: CPT

## 2022-03-21 NOTE — PLAN
[FreeTextEntry1] : 33 YO presents for an annual wellness visit. \par - PAP Up to date\par - RTO 1 year\par - Pt declined pelvic exam due to current menses\par - Pt had questions regarding her second pregnancy as she will try to conceive in 8/2022 and first pregnancy took >1 year. Consider letrozole if she cannot conceive within 6 months. Continue prenatal vitamin. \par

## 2022-03-21 NOTE — HISTORY OF PRESENT ILLNESS
[FreeTextEntry1] : 33 YO  presents for an annual wellness visit. Pt had an  10/2021 and prenatal course was complicated by SGA. Pt is planning to conceive again and may start trying in 2022. Pt cycle is typically every 28 days but had one cycle that was 40 days. Pt had a PAP 3/2019 elsewhere, reports negative with negative HPV.  She is teaching 2 college courses.  [TextBox_4] : Annual pt stated her period has been off [LMPDate] : 3/19/21

## 2022-05-03 NOTE — DISCHARGE NOTE OB - BREAST MILK SUPPORTS STABLE NEWBORN BLOOD SUGAR
This is a historical note converted from Srinivasan. Formatting and pictures may have been removed.  Please reference Srinivasan for original formatting and attached multimedia. Chief Complaint  CPX FASTING  History of Present Illness  Here for yearly exam.? She has developed pain in her joints.? Especially? fingers?& knees.? Worse over the last 4-6 months.? Fingers involve the?IP joints?especially the?left ring finger.? Worse in the morning.? Her knees have been hurting especially the left.? Getting in and out of her car she has to?lift the leg with her hands.? Treating with?Advil.? She is due to have a Prevnar vaccine?and will give today.? Recommended new shingles vaccine.? Fasting for lab and will refill her?Crestor.  Review of Systems  HEENT -?Dr. Castellon? eye exam - yrly  ?? Dr Marino Antoine/Damaso Flanagan ?- vestibular disorder? RX PT & OT??? MRI?Brain - [facility] -? WNL??? Dr. Agosto-[facility] - vertigo/BPPV  Still off balance - goes to OT regularly.? No falls  CHEST? - no SOB  C/V - no CP??? Hyperchol.  GI? - Dr KENIA Antoine colonoscope?10/14/13 ?polyp? repeat 5 yrs.????? EGD-GERD - rx Nexium   - nocturia  M/S - arthralgia - see HPI  GYN - Dr. Lashell Aparicio - s/p DONALDO/BS&O?????? Mammogram - Womens  ENDOCRINE - Pre Diabetes  ?  Physical Exam  Vitals & Measurements  BP:?130/90?  HT:?165.1?cm? HT:?165.1?cm? WT:?87?kg? WT:?87?kg? BMI:?31.92?  GEN?65 yr. old?white female. ?Well-developed.? Well-nourished. ?No acute distress.  SKIN?clear  PULSE?regular  HEENT?normal  NECK?no bruits  THYROID?normal  CHEST?clear  HEART?regular rate and rhythm without murmur  ABDOMEN?soft nontender no masses  EXTREMITIES?no edema  Hands-she has?bony enlargement of?left ring finger IP joint-no erythema-Full range of motion  ?? She has pain with range of motion of her?knees-especially extension-no swelling  Assessment/Plan  1.?Medicare annual wellness visit, subsequent  Ordered:  Clinic Follow up, *Est. 08/03/19 3:00:00 CDT, PLEASE  MAKE THIS A 30 MINUTE PHYSICAL, Order for future visit, Medicare annual wellness visit, subsequent, [facility]   Medicare Subsequent Wellness PC, Medicare annual wellness visit, subsequent  Hypercholesterolemia  Pre-diabetes  Arthralgia, [facility] AMB - AFP, 08/03/18 10:20:00 CDT  ?  2.?Hypercholesterolemia  Ordered:  Automated Diff, Routine collect, 08/03/18 10:21:00 CDT, Blood, Collected, Stop date 08/03/18 10:21:00 CDT, Lab Collect, Hypercholesterolemia  Arthralgia, 08/03/18 10:21:00 CDT  CBC w/ Auto Diff, Routine collect, 08/03/18 10:21:00 CDT, Blood, Stop date 08/03/18 10:21:00 CDT, Lab Collect, Hypercholesterolemia  Arthralgia, 08/03/18 10:21:00 CDT  Comprehensive Metabolic Panel, Routine collect, 08/03/18 10:21:00 CDT, Blood, Stop date 08/03/18 10:21:00 CDT, Lab Collect, Hypercholesterolemia  Arthralgia, 08/03/18 10:21:00 CDT   Medicare Subsequent Wellness PC, Medicare annual wellness visit, subsequent  Hypercholesterolemia  Pre-diabetes  Arthralgia, [facility] AMB - AFP, 08/03/18 10:20:00 CDT  Lab Collection Request, 08/03/18 10:20:00 CDT, [facility] AMB - AFP, 08/03/18 10:20:00 CDT  Lipid Panel, Routine collect, 08/03/18 10:21:00 CDT, Blood, Stop date 08/03/18 10:21:00 CDT, Lab Collect, Hypercholesterolemia  Arthralgia, 08/03/18 10:21:00 CDT  Urinalysis Complete no reflex, Routine collect, Urine, 08/03/18 10:21:00 CDT, Stop date 08/03/18 10:21:00 CDT, Nurse collect, Hypercholesterolemia  Pre-diabetes  Arthralgia  ?  3.?Pre-diabetes  Ordered:   Medicare Subsequent Wellness PC, Medicare annual wellness visit, subsequent  Hypercholesterolemia  Pre-diabetes  Arthralgia, [facility] AMB - AFP, 08/03/18 10:20:00 CDT  Hemoglobin A1c, Routine collect, 08/03/18 10:21:00 CDT, Blood, Stop date 08/03/18 10:21:00 CDT, Lab Collect, Pre-diabetes, 08/03/18 10:21:00 CDT  Lab Collection Request, 08/03/18 10:20:00 CDT, [facility] AMB - AFP, 08/03/18 10:20:00 CDT  Urinalysis Complete no reflex, Routine collect, Urine,  08/03/18 10:21:00 CDT, Stop date 08/03/18 10:21:00 CDT, Nurse collect, Hypercholesterolemia  Pre-diabetes  Arthralgia  ?  4.?Arthralgia  ?If any of the lab workup is positive?for arthritis?will refer to rheumatologist.? Otherwise she will take Tylenol?and we may refer her to an orthopedist?for her knee pain.  Ordered:  Automated Diff, Routine collect, 08/03/18 10:21:00 CDT, Blood, Collected, Stop date 08/03/18 10:21:00 CDT, Lab Collect, Hypercholesterolemia  Arthralgia, 08/03/18 10:21:00 CDT  C-Reactive Protein High Sensitivity, Routine collect, 08/03/18 10:20:00 CDT, Blood, Order for future visit, Stop date 08/03/18 10:20:00 CDT, Lab Collect, Arthralgia, 08/03/18 10:20:00 CDT  CBC w/ Auto Diff, Routine collect, 08/03/18 10:21:00 CDT, Blood, Stop date 08/03/18 10:21:00 CDT, Lab Collect, Hypercholesterolemia  Arthralgia, 08/03/18 10:21:00 CDT  Comprehensive Metabolic Panel, Routine collect, 08/03/18 10:21:00 CDT, Blood, Stop date 08/03/18 10:21:00 CDT, Lab Collect, Hypercholesterolemia  Arthralgia, 08/03/18 10:21:00 CDT  Cyclic Citrullinated Peptide (CCP) Abs, IgA, IgG-[facility] [id number], Routine collect, 08/03/18 10:20:00 CDT, Blood, Order for future visit, Stop date 08/03/18 10:20:00 CDT, Lab Collect, Arthralgia, 08/03/18 10:20:00 CDT   Medicare Subsequent Wellness PC, Medicare annual wellness visit, subsequent  Hypercholesterolemia  Pre-diabetes  Arthralgia, [facility] AMB - AFP, 08/03/18 10:20:00 CDT  Lab Collection Request, 08/03/18 10:20:00 CDT, [facility] AMB - AFP, 08/03/18 10:20:00 CDT  Lipid Panel, Routine collect, 08/03/18 10:21:00 CDT, Blood, Stop date 08/03/18 10:21:00 CDT, Lab Collect, Hypercholesterolemia  Arthralgia, 08/03/18 10:21:00 CDT  Rheumatoid Factor Quantitative, Routine collect, 08/03/18 10:20:00 CDT, Blood, Order for future visit, Stop date 08/03/18 10:20:00 CDT, Lab Collect, Arthralgia, 08/03/18 10:20:00 CDT  Sedimentation Rate, Routine collect, 08/03/18 10:20:00 CDT, Blood, Order for  future visit, Stop date 08/03/18 10:20:00 CDT, Lab Collect, Arthralgia, 08/03/18 10:20:00 CDT  Uric Acid, Routine collect, 08/03/18 10:21:00 CDT, Blood, Stop date 08/03/18 10:21:00 CDT, Lab Collect, Arthralgia, 08/03/18 10:21:00 CDT  Urinalysis Complete no reflex, Routine collect, Urine, 08/03/18 10:21:00 CDT, Stop date 08/03/18 10:21:00 CDT, Nurse collect, Hypercholesterolemia  Pre-diabetes  Arthralgia  XR Hand Left Minimum 3 Views, Routine, 08/03/18 10:31:00 CDT, Pain, None, Ambulatory, Rad Type, Arthralgia, [facility], 08/03/18 10:31:00 CDT  XR Hand Right Minimum 3 Views, Routine, 08/03/18 10:31:00 CDT, Pain, None, Ambulatory, Rad Type, Arthralgia, [facility], 08/03/18 10:31:00 CDT  XR Knee Left 1 or 2 Views, Routine, 08/03/18 10:31:00 CDT, Pain, None, Ambulatory, Rad Type, Arthralgia, [facility], 08/03/18 10:31:00 CDT  XR Knee Right 1 or 2 Views, Routine, 08/03/18 10:31:00 CDT, Pain, None, Ambulatory, Rad Type, Arthralgia, [facility], 08/03/18 10:31:00 CDT  ?  Orders:  rosuvastatin, 20 mg = 1 tab(s), Oral, Daily, # 90 tab(s), 3 Refill(s), Pharmacy: [facility] [id number]   Problem List/Past Medical History  Ongoing  Dizziness  Hypercholesterolemia  Obesity  Polyp colon  Pre-diabetes  Vestibular disorder  Historical  No qualifying data  Procedure/Surgical History  Right Foot (2016)  Colonoscopy (10/14/2013)  Appendectomy  Endoscopy  DONALDO BSO - Total abdominal hysterectomy and bilateral salpingo-oophorectomy  Tonsillectomy  Tubal ligation   Medications  cinnamon 500 mg oral capsule, 1000 mg= 2 cap(s), Oral, BID  PHENAZOPYRID TAB 200MG, 200 mg= 1 tab(s), Oral, TID,? ?Not Taking, Completed Rx  rosuvastatin 20 mg oral tablet, 20 mg= 1 tab(s), Oral, Daily, 3 refills  TOBRA/DEXAME HAI 0.3-0.1%,? 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  Childbirth: Mother.  Coronary artery disease: Father, Sister, Sister, Brother and Brother.  Diabetes mellitus type 2: Sister and Brother.  Gout: Brother.  Hypertension.: Brother and Brother.  Myocardial infarct: Brother.  Pacemaker rhythm: Sister.  Primary malignant neoplasm of lung: Father.  Stroke: Sister and Sister.  Immunizations  Vaccine Date Status   pneumococcal 13-valent conjugate vaccine 08/03/2018 Given   influenza virus vaccine, inactivated 10/05/2017 Recorded   pneumococcal 13-valent conjugate vaccine 08/01/2017 Recorded   zoster vaccine live 08/20/2013 Recorded   Health Maintenance  Health Maintenance  ???Pending?(in the next year)  ??? ??OverDue  ??? ? ? ?Pneumococcal Vaccine due??and every?  ??? ??Due?  ??? ? ? ?Alcohol Misuse Screening due??08/03/18??and every 1??year(s)  ??? ? ? ?Aspirin Therapy for CVD Prevention due??08/03/18??and every 1??year(s)  ??? ? ? ?Bone Density Screening due??08/03/18??Variable frequency  ??? ? ? ?Breast Cancer Screening ([facility]) due??08/03/18??and every?  ??? ? ? ?Fall Risk Assessment due??08/03/18??and every 1??year(s)  ??? ? ? ?Functional Assessment due??08/03/18??and every 1??year(s)  ??? ? ? ?Tetanus Vaccine due??08/03/18??and every 10??year(s)  ???Satisfied?(in the past 1 year)  ??? ??Satisfied?  ??? ? ? ?Blood Pressure Screening on??08/03/18.??Satisfied by Vandana Rico  ??? ? ? ?Body Mass Index Check on??08/03/18.??Satisfied by Vandana Rico  ??? ? ? ?Diabetes Screening on??08/03/18.??Satisfied by Matthew Nuñez MD  ??? ? ? ?Influenza Vaccine on??10/05/17.??Satisfied by Vandana Rico  ??? ? ? ?Lipid Screening on??08/03/18.??Satisfied by Matthew Nuñez MD  ??? ? ? ?Obesity  Screening on??08/03/18.??Satisfied by Vandana Rico  ??? ? ? ?Pneumococcal Vaccine on??08/03/18.??Satisfied by Vandana Rico  ?  ?      8/3/18 X-ray & Lab:? X ray knees and hands - osteroarthritis.? CBC, URIC ACID, RHEUMATOID FACTOR, CCP, SR.,U/A, LIPIDS, A1C- normal.? CRP- elevated 6.16.? CMP glucose-115.? Recommend referral to orthopedist - osteoathritis.   Statement Selected

## 2022-06-14 PROCEDURE — 99214 OFFICE O/P EST MOD 30 MIN: CPT | Mod: 95

## 2022-11-03 PROCEDURE — 99215 OFFICE O/P EST HI 40 MIN: CPT | Mod: 95

## 2023-01-10 ENCOUNTER — APPOINTMENT (OUTPATIENT)
Dept: OTOLARYNGOLOGY | Facility: CLINIC | Age: 33
End: 2023-01-10
Payer: COMMERCIAL

## 2023-01-10 ENCOUNTER — NON-APPOINTMENT (OUTPATIENT)
Age: 33
End: 2023-01-10

## 2023-01-10 DIAGNOSIS — H92.01 OTALGIA, RIGHT EAR: ICD-10-CM

## 2023-01-10 PROCEDURE — 92550 TYMPANOMETRY & REFLEX THRESH: CPT | Mod: 52

## 2023-01-10 PROCEDURE — 31231 NASAL ENDOSCOPY DX: CPT

## 2023-01-10 PROCEDURE — 92557 COMPREHENSIVE HEARING TEST: CPT

## 2023-01-10 PROCEDURE — 99204 OFFICE O/P NEW MOD 45 MIN: CPT | Mod: 25

## 2023-01-10 RX ORDER — METHYLPREDNISOLONE 4 MG/1
4 TABLET ORAL
Qty: 1 | Refills: 0 | Status: ACTIVE | COMMUNITY
Start: 2023-01-10 | End: 1900-01-01

## 2023-01-10 NOTE — PROCEDURE
[Congested] : congested [Nasal Mucosa] : purulence on the right [___ cm] : bilateral [unfilled]Ucm polyp(s) [Image(s) Captured] : image(s) captured and filed [Topical Lidocaine] : topical lidocaine [Oxymetazoline HCl] : oxymetazoline HCl [Flexible Endoscope] : examined with the flexible endoscope [Serial Number: ___] : Serial Number: [unfilled] [FreeTextEntry6] : right ear congestion.  Purulent post nasal discharge.   Bilateral nasal polyps   Turbinate Hypertrophy.  right ear congestion.  Purulent post nasal discharge.   [de-identified] :  Bilateral nasal polyps   Turbinate Hypertrophy. Anosmia

## 2023-01-10 NOTE — HISTORY OF PRESENT ILLNESS
[Polypectomy] : polypectomy [de-identified] : 33 years old female patient with history of Persistent right ear pain and nasal congestion for the past couple of days.   Patient is present today in the office with right ear congestion.  Purulent post nasal discharge.   Bilateral nasal polyps   Turbinate Hypertrophy.  History of COVID-19 12/26/20222.  Patient C/O Anosmia  [de-identified] : 8 years old ago by Dr. JOSÉ MANUEL Durant

## 2023-01-10 NOTE — REASON FOR VISIT
[Initial Evaluation] : an initial evaluation for [FreeTextEntry2] : Persistent right ear pain and nasal congestion for the past couple of days.   Patient states her level of severity is a level 9 out of 10 and it occurs constant.  Patient states nothing helps to improve or worsens  her Persistent right ear pain and nasal congestion for the past couple of days.

## 2023-01-10 NOTE — REVIEW OF SYSTEMS
[Patient Intake Form Reviewed] : Patient intake form was reviewed [Ear Pain] : ear pain [Hearing Loss] : hearing loss [As Noted in HPI] : as noted in HPI [Nasal Congestion] : nasal congestion [Negative] : Heme/Lymph

## 2023-01-10 NOTE — PHYSICAL EXAM
[de-identified] : right ear congestion.  Purulent post nasal discharge.   Bilateral nasal polyps   Turbinate Hypertrophy.   [de-identified] : right ear congestion.  Purulent post nasal discharge.   Bilateral nasal polyps   Turbinate Hypertrophy.   [Normal] : mucosa is normal [Midline] : trachea located in midline position

## 2023-01-17 ENCOUNTER — RESULT REVIEW (OUTPATIENT)
Age: 33
End: 2023-01-17

## 2023-01-17 ENCOUNTER — APPOINTMENT (OUTPATIENT)
Dept: CT IMAGING | Facility: HOSPITAL | Age: 33
End: 2023-01-17

## 2023-01-17 ENCOUNTER — OUTPATIENT (OUTPATIENT)
Dept: OUTPATIENT SERVICES | Facility: HOSPITAL | Age: 33
LOS: 1 days | End: 2023-01-17
Payer: COMMERCIAL

## 2023-01-17 DIAGNOSIS — F31.9 BIPOLAR DISORDER, UNSPECIFIED: Chronic | ICD-10-CM

## 2023-01-17 DIAGNOSIS — Z98.890 OTHER SPECIFIED POSTPROCEDURAL STATES: Chronic | ICD-10-CM

## 2023-01-17 LAB — EAR NOSE AND THROAT CULTURE: ABNORMAL

## 2023-01-17 PROCEDURE — 70486 CT MAXILLOFACIAL W/O DYE: CPT

## 2023-01-17 PROCEDURE — 70486 CT MAXILLOFACIAL W/O DYE: CPT | Mod: 26

## 2023-01-18 ENCOUNTER — APPOINTMENT (OUTPATIENT)
Dept: OTOLARYNGOLOGY | Facility: CLINIC | Age: 33
End: 2023-01-18
Payer: COMMERCIAL

## 2023-01-18 VITALS
HEART RATE: 80 BPM | RESPIRATION RATE: 20 BRPM | DIASTOLIC BLOOD PRESSURE: 75 MMHG | BODY MASS INDEX: 22.58 KG/M2 | TEMPERATURE: 98.3 F | HEIGHT: 60 IN | WEIGHT: 115 LBS | SYSTOLIC BLOOD PRESSURE: 108 MMHG | OXYGEN SATURATION: 100 %

## 2023-01-18 DIAGNOSIS — H93.8X1 OTHER SPECIFIED DISORDERS OF RIGHT EAR: ICD-10-CM

## 2023-01-18 PROCEDURE — 99213 OFFICE O/P EST LOW 20 MIN: CPT

## 2023-01-18 NOTE — PHYSICAL EXAM
[Midline] : trachea located in midline position [Normal] : no rashes [de-identified] : right ear congestion.  Purulent post nasal discharge.   Bilateral nasal polyps   Turbinate Hypertrophy.   [de-identified] : right ear congestion.  Purulent post nasal discharge.   Bilateral nasal polyps   Turbinate Hypertrophy.

## 2023-01-18 NOTE — HISTORY OF PRESENT ILLNESS
[Polypectomy] : polypectomy [de-identified] : 33 years old female patient with history of Persistent right ear pain and nasal congestion for the past couple of days.   Patient is present today in the office with right ear congestion.  Purulent post nasal discharge.   Bilateral nasal polyps   Turbinate Hypertrophy.  History of COVID-19 12/26/20222.  Patient C/O Anosmia.  Sinus C^T-Scan impression.  Sinusitis  [de-identified] : 8 years old ago by Dr. JOSÉ MANUEL Durant

## 2023-01-18 NOTE — PROCEDURE
[Congested] : congested [Nasal Mucosa] : purulence on the right [___ cm] : bilateral [unfilled]Ucm polyp(s) [FreeTextEntry6] : right ear congestion.  Purulent post nasal discharge.   Bilateral nasal polyps   Turbinate Hypertrophy.  right ear congestion.  Purulent post nasal discharge.   [de-identified] :  Bilateral nasal polyps   Turbinate Hypertrophy. Anosmia

## 2023-01-18 NOTE — REASON FOR VISIT
[Subsequent Evaluation] : a subsequent evaluation for [FreeTextEntry2] : Persistent right ear pain and nasal congestion for the past couple of days.

## 2023-02-06 DIAGNOSIS — Z01.818 ENCOUNTER FOR OTHER PREPROCEDURAL EXAMINATION: ICD-10-CM

## 2023-02-15 LAB — SARS-COV-2 N GENE NPH QL NAA+PROBE: DETECTED

## 2023-02-15 RX ORDER — SULFAMETHOXAZOLE AND TRIMETHOPRIM 800; 160 MG/1; MG/1
800-160 TABLET ORAL
Qty: 20 | Refills: 0 | Status: COMPLETED | COMMUNITY
Start: 2019-02-05 | End: 2023-02-15

## 2023-02-15 RX ORDER — AMOXICILLIN 500 MG/1
500 CAPSULE ORAL
Qty: 14 | Refills: 0 | Status: ACTIVE | COMMUNITY
Start: 2023-02-15 | End: 1900-01-01

## 2023-02-15 RX ORDER — ACETAMINOPHEN AND CODEINE 300; 30 MG/1; MG/1
300-30 TABLET ORAL
Qty: 25 | Refills: 0 | Status: DISCONTINUED | COMMUNITY
Start: 2023-02-15 | End: 2023-02-15

## 2023-02-15 RX ORDER — DOCUSATE SODIUM 100 MG/1
100 CAPSULE ORAL TWICE DAILY
Qty: 60 | Refills: 0 | Status: ACTIVE | COMMUNITY
Start: 2023-02-15 | End: 1900-01-01

## 2023-02-15 RX ORDER — AMOXICILLIN AND CLAVULANATE POTASSIUM 875; 125 MG/1; MG/1
875-125 TABLET, COATED ORAL
Qty: 14 | Refills: 0 | Status: COMPLETED | COMMUNITY
Start: 2023-01-10 | End: 2023-02-15

## 2023-02-15 RX ORDER — METHYLPREDNISOLONE 4 MG/1
4 TABLET ORAL
Qty: 1 | Refills: 0 | Status: ACTIVE | COMMUNITY
Start: 2023-02-15 | End: 1900-01-01

## 2023-02-27 ENCOUNTER — TRANSCRIPTION ENCOUNTER (OUTPATIENT)
Age: 33
End: 2023-02-27

## 2023-02-27 VITALS
DIASTOLIC BLOOD PRESSURE: 76 MMHG | OXYGEN SATURATION: 100 % | TEMPERATURE: 98 F | RESPIRATION RATE: 16 BRPM | WEIGHT: 112.66 LBS | HEIGHT: 60 IN | SYSTOLIC BLOOD PRESSURE: 108 MMHG | HEART RATE: 79 BPM

## 2023-02-27 RX ORDER — METHYLPREDNISOLONE 4 MG/1
4 TABLET ORAL
Qty: 1 | Refills: 0 | Status: ACTIVE | COMMUNITY
Start: 2023-02-27 | End: 1900-01-01

## 2023-02-27 RX ORDER — ACETAMINOPHEN AND CODEINE 300; 30 MG/1; MG/1
300-30 TABLET ORAL
Qty: 25 | Refills: 0 | Status: ACTIVE | COMMUNITY
Start: 2023-02-27 | End: 1900-01-01

## 2023-02-27 RX ORDER — AMOXICILLIN 500 MG/1
500 CAPSULE ORAL
Qty: 14 | Refills: 0 | Status: ACTIVE | COMMUNITY
Start: 2023-02-27 | End: 1900-01-01

## 2023-02-27 RX ORDER — DOCUSATE SODIUM 100 MG/1
100 CAPSULE ORAL TWICE DAILY
Qty: 60 | Refills: 0 | Status: ACTIVE | COMMUNITY
Start: 2023-02-27 | End: 1900-01-01

## 2023-02-27 NOTE — ASU PATIENT PROFILE, ADULT - NSICDXPASTMEDICALHX_GEN_ALL_CORE_FT
PAST MEDICAL HISTORY:  Acid reflux disease     Asthma takes albuterol as needed last used 1 day ago    Bipolar 2 disorder     H/O Meniere's disease     IBS (irritable bowel syndrome)     Migraines     Sinusitis

## 2023-02-28 ENCOUNTER — OUTPATIENT (OUTPATIENT)
Dept: OUTPATIENT SERVICES | Facility: HOSPITAL | Age: 33
LOS: 1 days | Discharge: HOME | End: 2023-02-28
Payer: COMMERCIAL

## 2023-02-28 ENCOUNTER — TRANSCRIPTION ENCOUNTER (OUTPATIENT)
Age: 33
End: 2023-02-28

## 2023-02-28 ENCOUNTER — APPOINTMENT (OUTPATIENT)
Dept: OTOLARYNGOLOGY | Facility: AMBULATORY SURGERY CENTER | Age: 33
End: 2023-02-28

## 2023-02-28 ENCOUNTER — RESULT REVIEW (OUTPATIENT)
Age: 33
End: 2023-02-28

## 2023-02-28 VITALS
DIASTOLIC BLOOD PRESSURE: 75 MMHG | SYSTOLIC BLOOD PRESSURE: 111 MMHG | OXYGEN SATURATION: 97 % | TEMPERATURE: 98 F | HEART RATE: 98 BPM | RESPIRATION RATE: 18 BRPM

## 2023-02-28 DIAGNOSIS — Z98.890 OTHER SPECIFIED POSTPROCEDURAL STATES: Chronic | ICD-10-CM

## 2023-02-28 DIAGNOSIS — F31.9 BIPOLAR DISORDER, UNSPECIFIED: Chronic | ICD-10-CM

## 2023-02-28 LAB
GRAM STN FLD: SIGNIFICANT CHANGE UP
GRAM STN FLD: SIGNIFICANT CHANGE UP
SPECIMEN SOURCE: SIGNIFICANT CHANGE UP
SPECIMEN SOURCE: SIGNIFICANT CHANGE UP

## 2023-02-28 PROCEDURE — 31288 NASAL/SINUS ENDOSCOPY SURG: CPT | Mod: 50

## 2023-02-28 PROCEDURE — 31267 ENDOSCOPY MAXILLARY SINUS: CPT | Mod: 50

## 2023-02-28 PROCEDURE — 88311 DECALCIFY TISSUE: CPT | Mod: 26

## 2023-02-28 PROCEDURE — 31253 NSL/SINS NDSC TOTAL: CPT | Mod: 50

## 2023-02-28 PROCEDURE — ZZZZZ: CPT

## 2023-02-28 PROCEDURE — 88305 TISSUE EXAM BY PATHOLOGIST: CPT | Mod: 26

## 2023-02-28 DEVICE — SURGIFLO HEMOSTATIC MATRIX KIT
Type: IMPLANTABLE DEVICE | Status: NON-FUNCTIONAL
Removed: 2023-02-28

## 2023-02-28 RX ORDER — BUPROPION HYDROCHLORIDE 150 MG/1
1 TABLET, EXTENDED RELEASE ORAL
Qty: 0 | Refills: 0 | DISCHARGE

## 2023-02-28 RX ORDER — BENZHYDROCODONE AND ACETAMINOPHEN 8.16; 325 MG/1; MG/1
1 TABLET ORAL
Qty: 0 | Refills: 0 | DISCHARGE

## 2023-02-28 RX ORDER — LAMOTRIGINE 25 MG/1
0 TABLET, ORALLY DISINTEGRATING ORAL
Qty: 0 | Refills: 0 | DISCHARGE

## 2023-02-28 RX ORDER — AMOXICILLIN 250 MG/5ML
1 SUSPENSION, RECONSTITUTED, ORAL (ML) ORAL
Qty: 0 | Refills: 0 | DISCHARGE

## 2023-02-28 RX ORDER — HYDROMORPHONE HYDROCHLORIDE 2 MG/ML
0.5 INJECTION INTRAMUSCULAR; INTRAVENOUS; SUBCUTANEOUS
Refills: 0 | Status: DISCONTINUED | OUTPATIENT
Start: 2023-02-28 | End: 2023-02-28

## 2023-02-28 RX ORDER — SODIUM CHLORIDE 9 MG/ML
1000 INJECTION, SOLUTION INTRAVENOUS
Refills: 0 | Status: DISCONTINUED | OUTPATIENT
Start: 2023-02-28 | End: 2023-02-28

## 2023-02-28 RX ORDER — ACETAMINOPHEN 500 MG
0 TABLET ORAL
Qty: 0 | Refills: 0 | DISCHARGE

## 2023-02-28 RX ORDER — DOXYLAMINE SUCCINATE 25 MG
1 TABLET ORAL
Qty: 0 | Refills: 0 | DISCHARGE

## 2023-02-28 RX ORDER — LAMOTRIGINE 25 MG/1
1 TABLET, ORALLY DISINTEGRATING ORAL
Qty: 0 | Refills: 0 | DISCHARGE

## 2023-02-28 RX ORDER — DOCUSATE SODIUM 100 MG
1 CAPSULE ORAL
Qty: 0 | Refills: 0 | DISCHARGE

## 2023-02-28 RX ADMIN — SODIUM CHLORIDE 100 MILLILITER(S): 9 INJECTION, SOLUTION INTRAVENOUS at 14:29

## 2023-02-28 RX ADMIN — HYDROMORPHONE HYDROCHLORIDE 0.5 MILLIGRAM(S): 2 INJECTION INTRAMUSCULAR; INTRAVENOUS; SUBCUTANEOUS at 11:35

## 2023-02-28 NOTE — ASU DISCHARGE PLAN (ADULT/PEDIATRIC) - NURSING INSTRUCTIONS
SINUS SURGERY (FESS) Post Operative Instructions  Do not blow your nose for 2 weeks after surgery. Avoid lifting > 5 lbs. and no vigorous exercise for 2 weeks after surgery.  Bloody nasal drainage is normal after this surgery for 5-7 days, usually decreasing in volume with each day that passes. Drainage will flow from the front of the nose and down the back of the throat  Nasal saline mist spray can be used once or twice a day 3 days after surgery.

## 2023-02-28 NOTE — BRIEF OPERATIVE NOTE - NSICDXBRIEFPREOP_GEN_ALL_CORE_FT
PRE-OP DIAGNOSIS:  Chronic pansinusitis 28-Feb-2023 07:22:20  Sara Alexandre  Ethmoid polyps 28-Feb-2023 07:22:31  Sara Alexandre  Sinusitis with nasal polyps 28-Feb-2023 07:22:45  Sara Alexandre

## 2023-02-28 NOTE — BRIEF OPERATIVE NOTE - NSICDXBRIEFPROCEDURE_GEN_ALL_CORE_FT
PROCEDURES:  Functional endoscopic sinus surgery (FESS) with antrostomy of both maxillary sinuses with ethmoidectomy and septoplasty 28-Feb-2023 07:22:01  Sara Alexandre

## 2023-02-28 NOTE — ASU DISCHARGE PLAN (ADULT/PEDIATRIC) - CARE PROVIDER_API CALL
Norris Layton)  Otolaryngology  [address], [address]  [place], [place] [address]  Phone: [phone]  Fax: [phone]  Established Patient  Follow Up Time: 1 week

## 2023-02-28 NOTE — ASU DISCHARGE PLAN (ADULT/PEDIATRIC) - NS MD DC FALL RISK RISK
For information on Fall & Injury Prevention, visit: [email or web address] OR  [email or web address] OR  https://www.cdc.gov/steadi/patient.html

## 2023-02-28 NOTE — ASU DISCHARGE PLAN (ADULT/PEDIATRIC) - MEDICATION INSTRUCTIONS
Start all medications on Wednesday.  Eligible Sinus Rinse Kit / start rinse on Thursday.  Rinse once a day only in the mornings

## 2023-02-28 NOTE — BRIEF OPERATIVE NOTE - COMMENTS
Please schedule an appointment to see Dr. Norris Layton in the outpatient department. (His Private Practice)

## 2023-02-28 NOTE — BRIEF OPERATIVE NOTE - NSICDXBRIEFPOSTOP_GEN_ALL_CORE_FT
POST-OP DIAGNOSIS:  Chronic pansinusitis 28-Feb-2023 07:23:04  Sara Alexandre  Ethmoidal polyp 28-Feb-2023 07:23:15  Sara Alexandre  Sinusitis with nasal polyps 28-Feb-2023 07:23:25  Sara Alexandre

## 2023-02-28 NOTE — PRE-ANESTHESIA EVALUATION ADULT - NSANTHOSAYNRD_GEN_A_CORE
No. CHRIS screening performed.  STOP BANG Legend: 0-2 = LOW Risk; 3-4 = INTERMEDIATE Risk; 5-8 = HIGH Risk
Discharged

## 2023-03-02 PROBLEM — K58.9 IRRITABLE BOWEL SYNDROME, UNSPECIFIED: Chronic | Status: ACTIVE | Noted: 2023-02-27

## 2023-03-02 PROBLEM — K21.9 GASTRO-ESOPHAGEAL REFLUX DISEASE WITHOUT ESOPHAGITIS: Chronic | Status: ACTIVE | Noted: 2023-02-27

## 2023-03-02 PROBLEM — K58.9 IRRITABLE BOWEL SYNDROME WITHOUT DIARRHEA: Chronic | Status: ACTIVE | Noted: 2023-02-27

## 2023-03-02 PROBLEM — F31.81 BIPOLAR II DISORDER: Chronic | Status: ACTIVE | Noted: 2023-02-27

## 2023-03-02 PROBLEM — G43.909 MIGRAINE, UNSPECIFIED, NOT INTRACTABLE, WITHOUT STATUS MIGRAINOSUS: Chronic | Status: ACTIVE | Noted: 2023-02-27

## 2023-03-02 LAB
-  AZTREONAM: SIGNIFICANT CHANGE UP
-  AZTREONAM: SIGNIFICANT CHANGE UP
-  CEFEPIME: SIGNIFICANT CHANGE UP
-  CEFEPIME: SIGNIFICANT CHANGE UP
-  CEFTRIAXONE: SIGNIFICANT CHANGE UP
-  CIPROFLOXACIN: SIGNIFICANT CHANGE UP
-  CIPROFLOXACIN: SIGNIFICANT CHANGE UP
-  CLINDAMYCIN: SIGNIFICANT CHANGE UP
-  ERYTHROMYCIN: SIGNIFICANT CHANGE UP
-  ERYTHROMYCIN: SIGNIFICANT CHANGE UP
-  GENTAMICIN: SIGNIFICANT CHANGE UP
-  GENTAMICIN: SIGNIFICANT CHANGE UP
-  LEVOFLOXACIN: 1.5 — SIGNIFICANT CHANGE UP
-  LEVOFLOXACIN: SIGNIFICANT CHANGE UP
-  LEVOFLOXACIN: SIGNIFICANT CHANGE UP
-  PENICILLIN: SIGNIFICANT CHANGE UP
-  PIPERACILLIN/TAZOBACTAM: SIGNIFICANT CHANGE UP
-  PIPERACILLIN/TAZOBACTAM: SIGNIFICANT CHANGE UP
-  TOBRAMYCIN: SIGNIFICANT CHANGE UP
-  TOBRAMYCIN: SIGNIFICANT CHANGE UP
CULTURE RESULTS: SIGNIFICANT CHANGE UP
CULTURE RESULTS: SIGNIFICANT CHANGE UP
METHOD TYPE: SIGNIFICANT CHANGE UP
ORGANISM # SPEC MICROSCOPIC CNT: SIGNIFICANT CHANGE UP
SPECIMEN SOURCE: SIGNIFICANT CHANGE UP
SPECIMEN SOURCE: SIGNIFICANT CHANGE UP

## 2023-03-08 ENCOUNTER — APPOINTMENT (OUTPATIENT)
Dept: OTOLARYNGOLOGY | Facility: CLINIC | Age: 33
End: 2023-03-08
Payer: COMMERCIAL

## 2023-03-08 VITALS
HEART RATE: 84 BPM | TEMPERATURE: 98 F | WEIGHT: 112 LBS | SYSTOLIC BLOOD PRESSURE: 110 MMHG | BODY MASS INDEX: 21.99 KG/M2 | OXYGEN SATURATION: 99 % | HEIGHT: 60 IN | DIASTOLIC BLOOD PRESSURE: 73 MMHG

## 2023-03-08 PROCEDURE — 99024 POSTOP FOLLOW-UP VISIT: CPT

## 2023-03-08 PROCEDURE — 31237 NSL/SINS NDSC SURG BX POLYPC: CPT | Mod: 50,58

## 2023-03-08 NOTE — PROCEDURE
[Debridement] : debridement  [Bilateral] : bilateral debridement of the nasal cavity [Severe] : severe [Chris] : on both sides [Removed] : which was removed

## 2023-03-08 NOTE — HISTORY OF PRESENT ILLNESS
[de-identified] : 33 years old female patient with history of Status post FESS.  Patient is present today today in the office for nasal debridement .  Bacitracin ointment was inserted into patient nostrils for lubrication.

## 2023-03-08 NOTE — PHYSICAL EXAM
[Normal] : no rashes [de-identified] : right ear congestion.  Purulent post nasal discharge.   Bilateral nasal polyps   Turbinate Hypertrophy.   [de-identified] : right ear congestion.  Purulent post nasal discharge.   Bilateral nasal polyps   Turbinate Hypertrophy.

## 2023-03-15 ENCOUNTER — APPOINTMENT (OUTPATIENT)
Dept: OTOLARYNGOLOGY | Facility: CLINIC | Age: 33
End: 2023-03-15
Payer: COMMERCIAL

## 2023-03-15 VITALS
WEIGHT: 114 LBS | HEIGHT: 60 IN | SYSTOLIC BLOOD PRESSURE: 107 MMHG | OXYGEN SATURATION: 100 % | BODY MASS INDEX: 22.38 KG/M2 | TEMPERATURE: 98.1 F | HEART RATE: 85 BPM | DIASTOLIC BLOOD PRESSURE: 71 MMHG

## 2023-03-15 DIAGNOSIS — J32.2 CHRONIC ETHMOIDAL SINUSITIS: ICD-10-CM

## 2023-03-15 PROCEDURE — 31237 NSL/SINS NDSC SURG BX POLYPC: CPT | Mod: 50,58

## 2023-03-15 PROCEDURE — 99024 POSTOP FOLLOW-UP VISIT: CPT

## 2023-03-15 NOTE — PHYSICAL EXAM
[Normal] : no rashes [de-identified] : right ear congestion.  Purulent post nasal discharge.   Bilateral nasal polyps   Turbinate Hypertrophy.   [de-identified] : right ear congestion.  Purulent post nasal discharge.   Bilateral nasal polyps   Turbinate Hypertrophy.

## 2023-03-15 NOTE — HISTORY OF PRESENT ILLNESS
[de-identified] : 33 years old female patient with history of Status post FESS.  Patient is present today today in the office for nasal debridement .  Bacitracin ointment was inserted into patient nostrils for lubrication.

## 2023-03-23 LAB — SURGICAL PATHOLOGY STUDY: SIGNIFICANT CHANGE UP

## 2023-03-28 ENCOUNTER — APPOINTMENT (OUTPATIENT)
Dept: OBGYN | Facility: CLINIC | Age: 33
End: 2023-03-28
Payer: COMMERCIAL

## 2023-03-28 VITALS
OXYGEN SATURATION: 100 % | BODY MASS INDEX: 22.19 KG/M2 | HEIGHT: 60 IN | SYSTOLIC BLOOD PRESSURE: 112 MMHG | DIASTOLIC BLOOD PRESSURE: 76 MMHG | WEIGHT: 113 LBS | HEART RATE: 80 BPM

## 2023-03-28 DIAGNOSIS — N97.9 FEMALE INFERTILITY, UNSPECIFIED: ICD-10-CM

## 2023-03-28 DIAGNOSIS — Z01.419 ENCOUNTER FOR GYNECOLOGICAL EXAMINATION (GENERAL) (ROUTINE) W/OUT ABNORMAL FINDINGS: ICD-10-CM

## 2023-03-28 DIAGNOSIS — Z11.51 ENCOUNTER FOR SCREENING FOR HUMAN PAPILLOMAVIRUS (HPV): ICD-10-CM

## 2023-03-28 PROCEDURE — 99395 PREV VISIT EST AGE 18-39: CPT

## 2023-03-28 RX ORDER — LETROZOLE TABLETS 2.5 MG/1
2.5 TABLET, FILM COATED ORAL
Qty: 5 | Refills: 2 | Status: ACTIVE | COMMUNITY
Start: 2023-03-28 | End: 1900-01-01

## 2023-03-28 NOTE — PLAN
[FreeTextEntry1] : 32 yo female pt present for an annual wellness exam\par \par HCM\par -pap done today \par -continue taking PNV\par -Start letrozole\par -rto 1 year or when pregnant

## 2023-03-28 NOTE — HISTORY OF PRESENT ILLNESS
[Patient reported PAP Smear was normal] : Patient reported PAP Smear was normal [FreeTextEntry1] : 32 yo female pt  present for an annual wellness exam. She was last seen for an annual 2022. The pt is well and has no complaints. Pt has been trying to conceive x 7 months and would like to try letrozole (pt was prescribed this prior to fist pregnancy but ended up conceiving spontaneously) She is taking PNV. The pt reports having regular periods. 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 \par \par OBHx:  oct. 2021\par PSHx: Sinus Surgery\par \par  [PapSmeardate] : 03/19

## 2023-03-29 LAB — HPV HIGH+LOW RISK DNA PNL CVX: NOT DETECTED

## 2023-03-31 ENCOUNTER — TRANSCRIPTION ENCOUNTER (OUTPATIENT)
Age: 33
End: 2023-03-31

## 2023-03-31 LAB — CYTOLOGY CVX/VAG DOC THIN PREP: NORMAL

## 2023-05-03 ENCOUNTER — APPOINTMENT (OUTPATIENT)
Dept: OTOLARYNGOLOGY | Facility: CLINIC | Age: 33
End: 2023-05-03
Payer: COMMERCIAL

## 2023-05-03 VITALS
HEART RATE: 84 BPM | SYSTOLIC BLOOD PRESSURE: 113 MMHG | WEIGHT: 113 LBS | TEMPERATURE: 98.3 F | OXYGEN SATURATION: 99 % | BODY MASS INDEX: 22.19 KG/M2 | DIASTOLIC BLOOD PRESSURE: 73 MMHG | HEIGHT: 60 IN

## 2023-05-03 DIAGNOSIS — R43.0 ANOSMIA: ICD-10-CM

## 2023-05-03 PROCEDURE — 96372 THER/PROPH/DIAG INJ SC/IM: CPT | Mod: 58

## 2023-05-03 PROCEDURE — 99212 OFFICE O/P EST SF 10 MIN: CPT | Mod: 25

## 2023-05-03 NOTE — HISTORY OF PRESENT ILLNESS
[de-identified] : 33 years old female patient with history of Status post FESS.  Patient is present today today in the office for nasal debridement.  Patient C/O bilateral anosmia for the past 2 weeks. Allergic Rhinitis.  Turbinate Hypertrophy.   Pocket Smell Test /  A 3-odor, forced-choice screening philip was performed.  Altered sense of smell.  DEPO-MEDROL 40 mg injection was administered.

## 2023-05-03 NOTE — REVIEW OF SYSTEMS
[Patient Intake Form Reviewed] : Patient intake form was reviewed [As Noted in HPI] : as noted in HPI [Nasal Congestion] : nasal congestion [Negative] : Heme/Lymph [de-identified] : Bilateral anosmia

## 2023-05-03 NOTE — PHYSICAL EXAM
[Normal] : no rashes [de-identified] : right ear congestion.  Purulent post nasal discharge.   Bilateral nasal polyps   Turbinate Hypertrophy.   [de-identified] : right ear congestion.  Purulent post nasal discharge.   Bilateral nasal polyps   Turbinate Hypertrophy.

## 2023-05-29 NOTE — OB RN PATIENT PROFILE - WEIGHT: PREPREGNANCY IN LBS
LAB ORDERS      2023      ORDERING MD:   PIETER Hennessy     [facility] - UROLOGY  [facility], [place]         Patient Name: Ezequiel Escudero               : 1954    [facility] Number: [id number]                  #:        [facility] HOME HEALTH OUTPATIENT ORDERS:      On 23 OR 23 remove centeno catheter in early morning for voiding trial.  (NO LATER THAN 9 am!!!)  Make sure pt is urinating or attempts to urinate on [facility] chair or to toilet every 2-3 hrs during the daytime.  If by 3-4 pm, on the same day the patient is unable to urinate on their own then perform an in and out catheter in order to check for urine residual.  If urine residual 300 mL or Greater, then reinsert an 14 Djib[place]ian or 16 [place]ian COUDE indwelling centeno catheter within the bladder.  Then contact our Urology office at [phone] for further instructions if indicated.    DIAGNOSIS CODES:  N40.1, N31.9, R33.9               116

## 2023-06-07 ENCOUNTER — APPOINTMENT (OUTPATIENT)
Dept: OTOLARYNGOLOGY | Facility: CLINIC | Age: 33
End: 2023-06-07
Payer: COMMERCIAL

## 2023-06-07 VITALS
HEIGHT: 60 IN | SYSTOLIC BLOOD PRESSURE: 107 MMHG | BODY MASS INDEX: 22.19 KG/M2 | DIASTOLIC BLOOD PRESSURE: 75 MMHG | WEIGHT: 113 LBS | TEMPERATURE: 98 F | OXYGEN SATURATION: 100 % | HEART RATE: 77 BPM

## 2023-06-07 PROCEDURE — 99213 OFFICE O/P EST LOW 20 MIN: CPT

## 2023-06-07 NOTE — HISTORY OF PRESENT ILLNESS
[de-identified] : 33 years old female patient with history of Status post FESS.  Patient is present today with Allergic Rhinitis.  Patient C/O recurrent nasal obstruction and difficulty breathing for the past couple of weeks.

## 2023-06-07 NOTE — REVIEW OF SYSTEMS
[Patient Intake Form Reviewed] : Patient intake form was reviewed [Nasal Congestion] : nasal congestion [As Noted in HPI] : as noted in HPI [Shortness Of Breath] : shortness of breath [Negative] : Heme/Lymph [de-identified] : Bilateral anosmia

## 2023-06-07 NOTE — HISTORY OF PRESENT ILLNESS
[de-identified] : 33 years old female patient with history of Status post FESS.  Patient is present today with Allergic Rhinitis.  Patient C/O recurrent nasal obstruction and difficulty breathing for the past couple of weeks.

## 2023-06-07 NOTE — PHYSICAL EXAM
[Normal] : no rashes [de-identified] : right ear congestion.  Purulent post nasal discharge.   Bilateral nasal polyps   Turbinate Hypertrophy.   [de-identified] : right ear congestion.  Purulent post nasal discharge.   Bilateral nasal polyps   Turbinate Hypertrophy.

## 2023-06-07 NOTE — REVIEW OF SYSTEMS
[Patient Intake Form Reviewed] : Patient intake form was reviewed [Nasal Congestion] : nasal congestion [As Noted in HPI] : as noted in HPI [Shortness Of Breath] : shortness of breath [Negative] : Heme/Lymph [de-identified] : Bilateral anosmia

## 2023-06-07 NOTE — PHYSICAL EXAM
[Normal] : no rashes [de-identified] : right ear congestion.  Purulent post nasal discharge.   Bilateral nasal polyps   Turbinate Hypertrophy.   [de-identified] : right ear congestion.  Purulent post nasal discharge.   Bilateral nasal polyps   Turbinate Hypertrophy.

## 2023-06-07 NOTE — REASON FOR VISIT
[Post-Operative Visit] : a post-operative visit [FreeTextEntry2] : Status post FESS.  Patient C/O recurrent nasal obstruction and difficulty breathing for the past couple of weeks.

## 2023-06-08 ENCOUNTER — NON-APPOINTMENT (OUTPATIENT)
Age: 33
End: 2023-06-08

## 2023-06-14 ENCOUNTER — APPOINTMENT (OUTPATIENT)
Dept: OTOLARYNGOLOGY | Facility: CLINIC | Age: 33
End: 2023-06-14
Payer: COMMERCIAL

## 2023-06-14 DIAGNOSIS — J33.8 OTHER POLYP OF SINUS: ICD-10-CM

## 2023-06-14 DIAGNOSIS — J34.2 DEVIATED NASAL SEPTUM: ICD-10-CM

## 2023-06-14 PROCEDURE — 31000 IRRIGATION MAXILLARY SINUS: CPT | Mod: 50

## 2023-06-14 NOTE — HISTORY OF PRESENT ILLNESS
[de-identified] : 33 years old female patient with history of Status post FESS.  Patient is present today with Allergic Rhinitis.  Patient C/O recurrent nasal obstruction and difficulty breathing for the past couple of weeks. Balloon Maxillary Sinus Wash

## 2023-06-14 NOTE — PROCEDURE
[Image(s) Captured] : image(s) captured and filed [Topical Lidocaine] : topical lidocaine [Oxymetazoline HCl] : oxymetazoline HCl [Rigid Endoscope] : examined with a rigid endoscope [Serial Number: ___] : Serial Number: [unfilled] [Congested] : congested [___ cm] : bilateral [unfilled]Ucm polyp(s) [FreeTextEntry1] : Balloon Maxillary Sinus Wash  [FreeTextEntry3] : Balloon Maxillary Sinus Wash  [Removed] : which was not removed

## 2023-06-14 NOTE — PHYSICAL EXAM
[Normal] : no rashes [de-identified] : right ear congestion.  Purulent post nasal discharge.   Bilateral nasal polyps   Turbinate Hypertrophy.   [de-identified] : right ear congestion.  Purulent post nasal discharge.   Bilateral nasal polyps   Turbinate Hypertrophy.

## 2023-06-14 NOTE — REASON FOR VISIT
[Subsequent Evaluation] : a subsequent evaluation for [FreeTextEntry2] : Status post FESS.  Patient C/O recurrent nasal obstruction and difficulty breathing for the past couple of weeks.

## 2023-07-19 ENCOUNTER — APPOINTMENT (OUTPATIENT)
Dept: OTOLARYNGOLOGY | Facility: CLINIC | Age: 33
End: 2023-07-19
Payer: COMMERCIAL

## 2023-07-19 VITALS
HEART RATE: 97 BPM | WEIGHT: 113 LBS | SYSTOLIC BLOOD PRESSURE: 100 MMHG | BODY MASS INDEX: 22.19 KG/M2 | HEIGHT: 60 IN | DIASTOLIC BLOOD PRESSURE: 66 MMHG

## 2023-07-19 DIAGNOSIS — J32.2 CHRONIC ETHMOIDAL SINUSITIS: ICD-10-CM

## 2023-07-19 DIAGNOSIS — J33.1 POLYPOID SINUS DEGENERATION: ICD-10-CM

## 2023-07-19 DIAGNOSIS — J32.8 OTHER CHRONIC SINUSITIS: ICD-10-CM

## 2023-07-19 PROCEDURE — 99213 OFFICE O/P EST LOW 20 MIN: CPT

## 2023-07-19 NOTE — HISTORY OF PRESENT ILLNESS
[de-identified] : 33 years old female patient with history of Status post FESS.  Patient is present today with Allergic Rhinitis.  Patient C/O recurrent nasal obstruction and difficulty breathing for the past couple of weeks. Status post  Balloon Maxillary Sinus Wash.  Patient C/O persistent nasal congestion and difficulty breathing through her nose.

## 2023-07-19 NOTE — REVIEW OF SYSTEMS
[Patient Intake Form Reviewed] : Patient intake form was reviewed [Nasal Congestion] : nasal congestion [As Noted in HPI] : as noted in HPI [Shortness Of Breath] : shortness of breath [Negative] : Heme/Lymph [de-identified] : Bilateral anosmia

## 2023-07-19 NOTE — PROCEDURE
[Rigid Endoscope] : examined with a rigid endoscope [Congested] : congested [___ cm] : bilateral [unfilled]Ucm polyp(s) [Removed] : which was not removed

## 2023-07-19 NOTE — PHYSICAL EXAM
[Normal] : no rashes [de-identified] : right ear congestion.  Purulent post nasal discharge.   Bilateral nasal polyps   Turbinate Hypertrophy.   [de-identified] : right ear congestion.  Purulent post nasal discharge.   Bilateral nasal polyps   Turbinate Hypertrophy.

## 2023-07-26 ENCOUNTER — APPOINTMENT (OUTPATIENT)
Dept: OBGYN | Facility: CLINIC | Age: 33
End: 2023-07-26
Payer: COMMERCIAL

## 2023-07-26 VITALS
HEIGHT: 60 IN | HEART RATE: 79 BPM | BODY MASS INDEX: 22.97 KG/M2 | DIASTOLIC BLOOD PRESSURE: 62 MMHG | OXYGEN SATURATION: 99 % | WEIGHT: 117 LBS | SYSTOLIC BLOOD PRESSURE: 99 MMHG

## 2023-07-26 DIAGNOSIS — Z31.69 ENCOUNTER FOR OTHER GENERAL COUNSELING AND ADVICE ON PROCREATION: ICD-10-CM

## 2023-07-26 PROCEDURE — 99214 OFFICE O/P EST MOD 30 MIN: CPT

## 2023-07-26 NOTE — HISTORY OF PRESENT ILLNESS
[FreeTextEntry1] : 34 y/o Pt here for a CON regarding medications while aPt saw Dr. Ruelas Letmari a few months ago with issues conceiving. Pt has been having trouble with her asthma and had surgery in February for her sinuses . Pt is currently taking Dupixent and is concerned about the odds of her maintaining a healthy pregnancy while taking this medication. \par States it took her over  a year to get pregnant with first child\par \par Current Meds:\par Wellbutrin, omeprazole, and bipolar medication

## 2023-07-26 NOTE — PLAN
[FreeTextEntry1] : 32 y/o Pt here for CON regarding medication and pregnancy.\par \par -reviewed asthma and pregnancy, uncontrolled asthma/pregnancy, inc risk of PEC, PTL, LBW, C/S, GDM with uncontrolled asthma. Discussed poorly controlled asthma has a greater maternal/fetal risk than asthma medications \par -reviewed medication that pt is on and preg implications, discussed can continue on mood medication\par -part of [facility] program\par -discussed dupixent limited data in pregnancy, few case reports. Discussed monoclonal ab, crosses placenta. and fetal exposure generally increases as pregnancy progresses. \par -discussed Letrozole, risk of multiples, all questions answered\par -declining LORRAINE referral\par \par Tiffanie Durand MD

## 2024-01-29 ENCOUNTER — APPOINTMENT (OUTPATIENT)
Dept: OBGYN | Facility: CLINIC | Age: 34
End: 2024-01-29
Payer: COMMERCIAL

## 2024-01-29 ENCOUNTER — NON-APPOINTMENT (OUTPATIENT)
Age: 34
End: 2024-01-29

## 2024-01-29 VITALS
DIASTOLIC BLOOD PRESSURE: 78 MMHG | SYSTOLIC BLOOD PRESSURE: 115 MMHG | WEIGHT: 119 LBS | BODY MASS INDEX: 23.36 KG/M2 | HEART RATE: 89 BPM | OXYGEN SATURATION: 100 % | HEIGHT: 60 IN

## 2024-01-29 PROCEDURE — 99213 OFFICE O/P EST LOW 20 MIN: CPT

## 2024-01-29 PROCEDURE — 36415 COLL VENOUS BLD VENIPUNCTURE: CPT

## 2024-01-29 NOTE — PLAN
[FreeTextEntry1] :   -TVU done, +GS, +YS, no FH -Check beta HCG and progesterone, repeat in 48hrs -Repeat US in 1wk

## 2024-01-29 NOTE — HISTORY OF PRESENT ILLNESS
[FreeTextEntry1] : 34 year old  female presents for evaluation for vaginal bleeding after a + pregnancy test, LMP 12/10/23. H/o  x1. Reports +Ovulation Kit on  and believes that the day she conceived. Started spotting on Sat and bleeding became heavier since with back pain and cramping.

## 2024-01-29 NOTE — END OF VISIT
[FreeTextEntry3] : I, Emerald Addy, acted as a scribe on behalf of Dr. Arielle Ruelas M.D. on 01/29/2024.  All medical entries made by the scribe were at my, Dr. Arielle Ruelas M.D., direction and personally dictated by me on 01/29/2024. I have reviewed the chart and agree that the record accurately reflects my personal performance of the history, physical exam, assessment and plan. I have also personally directed, reviewed, and agreed with the chart.

## 2024-01-29 NOTE — PROCEDURE
[Transvaginal OB Sonogram] : Transvaginal OB Sonogram [Intrauterine Pregnancy] : intrauterine pregnancy [Yolk Sac] : yolk sac present [Transvaginal OB Sonogram WNL] : Transvaginal OB Sonogram WNL [Fetal Heart] : no fetal heart [Current GA by Sonogram: ___ (wks)] : Current GA by Sonogram: [unfilled]Uwks [___ day(s)] : [unfilled] days [FreeTextEntry1] : no fetal pole seen, GS 5 weeks 5 days

## 2024-01-30 LAB
HCG SERPL-MCNC: 4198 MIU/ML
PROGEST SERPL-MCNC: 7.5 NG/ML

## 2024-01-31 ENCOUNTER — APPOINTMENT (OUTPATIENT)
Dept: OBGYN | Facility: CLINIC | Age: 34
End: 2024-01-31

## 2024-02-01 DIAGNOSIS — O20.0 THREATENED ABORTION: ICD-10-CM

## 2024-02-01 LAB — HCG SERPL-MCNC: 3443 MIU/ML

## 2024-02-01 RX ORDER — MISOPROSTOL 200 UG/1
200 TABLET ORAL
Qty: 4 | Refills: 0 | Status: ACTIVE | COMMUNITY
Start: 2024-02-01 | End: 1900-01-01

## 2024-02-06 ENCOUNTER — APPOINTMENT (OUTPATIENT)
Dept: OBGYN | Facility: CLINIC | Age: 34
End: 2024-02-06
Payer: COMMERCIAL

## 2024-02-06 VITALS
HEIGHT: 60 IN | SYSTOLIC BLOOD PRESSURE: 111 MMHG | BODY MASS INDEX: 23.75 KG/M2 | HEART RATE: 90 BPM | OXYGEN SATURATION: 99 % | DIASTOLIC BLOOD PRESSURE: 72 MMHG | WEIGHT: 121 LBS

## 2024-02-06 DIAGNOSIS — O03.9 COMPLETE OR UNSPECIFIED SPONTANEOUS ABORTION W/OUT COMPLICATION: ICD-10-CM

## 2024-02-06 DIAGNOSIS — R53.83 OTHER FATIGUE: ICD-10-CM

## 2024-02-06 PROCEDURE — 99213 OFFICE O/P EST LOW 20 MIN: CPT

## 2024-02-06 PROCEDURE — 76817 TRANSVAGINAL US OBSTETRIC: CPT | Mod: 59

## 2024-02-06 PROCEDURE — 36415 COLL VENOUS BLD VENIPUNCTURE: CPT

## 2024-02-06 NOTE — HISTORY OF PRESENT ILLNESS
[FreeTextEntry1] : 33 y/o  pt presenting for f/u of SAB in progress. Last seen on 24. On US, gestational sac was seen in uterus. HCG on  was 4198 and on  it was 3443. Progesterone was 7.5. She believes she passed gestational sac on Thursday. Reports light vaginal bleeding and feelings of extreme fatigue.   SocHx: she is a  at [employer].  ObHx:  x1 (10/2021), SAB x1 (2024) PMHx: depression/bipolar II, IBS, asthma, sinusitis

## 2024-02-06 NOTE — PROCEDURE
[Transvaginal OB Sonogram] : Transvaginal OB Sonogram [Transvaginal OB Sonogram WNL] : Transvaginal OB Sonogram WNL [Intrauterine Pregnancy] : no intrauterine pregnancy [Yolk Sac] : no yolk sac [Fetal Heart] : no fetal heart [FreeTextEntry1] : No gestational sac Uterine lining measuring 4.3 mm

## 2024-02-06 NOTE — PLAN
[FreeTextEntry1] : 35 y/o  s/p SAB.   - TVUS showed no gestational sac, uterine lining measuring 4.3 mm - check HCG - check TSH and iron studies due to fatigue - pelvic rest x1 week - suggested LORRAINE consultation

## 2024-02-07 LAB
BASOPHILS # BLD AUTO: 0.06 K/UL
BASOPHILS NFR BLD AUTO: 0.8 %
EOSINOPHIL # BLD AUTO: 0.36 K/UL
EOSINOPHIL NFR BLD AUTO: 5 %
FERRITIN SERPL-MCNC: 74 NG/ML
HCG SERPL-MCNC: 94 MIU/ML
HCT VFR BLD CALC: 34.8 %
HGB BLD-MCNC: 11.7 G/DL
IMM GRANULOCYTES NFR BLD AUTO: 0.1 %
IRON SATN MFR SERPL: 51 %
IRON SERPL-MCNC: 128 UG/DL
LYMPHOCYTES # BLD AUTO: 2.32 K/UL
LYMPHOCYTES NFR BLD AUTO: 32.1 %
MAN DIFF?: NORMAL
MCHC RBC-ENTMCNC: 33.1 PG
MCHC RBC-ENTMCNC: 33.6 GM/DL
MCV RBC AUTO: 98.3 FL
MONOCYTES # BLD AUTO: 0.36 K/UL
MONOCYTES NFR BLD AUTO: 5 %
NEUTROPHILS # BLD AUTO: 4.12 K/UL
NEUTROPHILS NFR BLD AUTO: 57 %
PLATELET # BLD AUTO: 449 K/UL
RBC # BLD: 3.54 M/UL
RBC # FLD: 11.6 %
TIBC SERPL-MCNC: 254 UG/DL
TSH SERPL-ACNC: 0.71 UIU/ML
UIBC SERPL-MCNC: 126 UG/DL
WBC # FLD AUTO: 7.23 K/UL

## 2024-05-01 ENCOUNTER — APPOINTMENT (OUTPATIENT)
Dept: OBGYN | Facility: CLINIC | Age: 34
End: 2024-05-01
Payer: COMMERCIAL

## 2024-05-01 VITALS
BODY MASS INDEX: 22.97 KG/M2 | HEART RATE: 89 BPM | OXYGEN SATURATION: 100 % | HEIGHT: 60 IN | SYSTOLIC BLOOD PRESSURE: 113 MMHG | WEIGHT: 117 LBS | DIASTOLIC BLOOD PRESSURE: 67 MMHG

## 2024-05-01 DIAGNOSIS — Z32.01 ENCOUNTER FOR PREGNANCY TEST, RESULT POSITIVE: ICD-10-CM

## 2024-05-01 PROCEDURE — 36415 COLL VENOUS BLD VENIPUNCTURE: CPT

## 2024-05-01 PROCEDURE — 99213 OFFICE O/P EST LOW 20 MIN: CPT | Mod: 25

## 2024-05-01 PROCEDURE — 76830 TRANSVAGINAL US NON-OB: CPT

## 2024-05-01 NOTE — PLAN
[FreeTextEntry1] : 33 y/o Pt presenting for AMN  -Pap smear done today - GC Culture via pap -OB Blood work  -Discussed expanded carrier screening declining currently, basic screen neg from 2021 2 week NOB due to maternal anxiety 4 week AVNI/USC in GN Tiffanie Durand MD

## 2024-05-01 NOTE — HISTORY OF PRESENT ILLNESS
[FreeTextEntry1] : 33 y/o Pt presenting for AMN. LMP was . Pt is feeling well. Pt is a little nauseous. Pt has been having stomach cramps and back pain. Desired planning pregnancy  Pobhx:  , SAB   pmh: DEPRESSION, BP2, IBS, Ashtma, Sinutiis NKDA  has carrier screen basic neg from , declining expanded screen

## 2024-05-01 NOTE — PROCEDURE
[Cervical Pap Smear] : cervical Pap smear [Liquid Base] : liquid base [GC & Chlamydia via Pap] : GC & Chlamydia via Pap [Tolerated Well] : the patient tolerated the procedure well [No Complications] : there were no complications [Transvaginal OB Sonogram] : Transvaginal OB Sonogram [Intrauterine Pregnancy] : intrauterine pregnancy [Yolk Sac] : yolk sac present [Fetal Heart] : fetal heart present [Transvaginal OB Sonogram WNL] : Transvaginal OB Sonogram WNL [FreeTextEntry1] : live iUP seen c/.w dates 8w2day anel 12/9/24

## 2024-05-01 NOTE — END OF VISIT
[FreeTextEntry3] : I, Leathadarius Ferris, acted as a scribe on behalf of Dr. Durand on 05/01/2024.  All medical entries made by the scribe were at my, Dr. Durand, direction and personally dictated by me on 05/01/2024. I have reviewed the chart and agree that the record accurately reflects my personal performance of the history, physical exam, assessment and plan. I have also personally directed, reviewed, and agreed with the chart.

## 2024-05-02 LAB
25(OH)D3 SERPL-MCNC: 44.2 NG/ML
ABO + RH PNL BLD: NORMAL
BASOPHILS # BLD AUTO: 0.09 K/UL
BASOPHILS NFR BLD AUTO: 0.9 %
BLD GP AB SCN SERPL QL: NORMAL
C TRACH RRNA SPEC QL NAA+PROBE: NOT DETECTED
EOSINOPHIL # BLD AUTO: 0.68 K/UL
EOSINOPHIL NFR BLD AUTO: 6.7 %
ESTIMATED AVERAGE GLUCOSE: 105 MG/DL
HBA1C MFR BLD HPLC: 5.3 %
HCT VFR BLD CALC: 36.4 %
HGB A MFR BLD: 97.7 %
HGB A2 MFR BLD: 2.3 %
HGB BLD-MCNC: 12.4 G/DL
HGB FRACT BLD-IMP: NORMAL
HIV1+2 AB SPEC QL IA.RAPID: NONREACTIVE
HPV HIGH+LOW RISK DNA PNL CVX: NOT DETECTED
IMM GRANULOCYTES NFR BLD AUTO: 0.3 %
LYMPHOCYTES # BLD AUTO: 2.36 K/UL
LYMPHOCYTES NFR BLD AUTO: 23.4 %
MAN DIFF?: NORMAL
MCHC RBC-ENTMCNC: 34.1 GM/DL
MCHC RBC-ENTMCNC: 34.3 PG
MCV RBC AUTO: 100.8 FL
MONOCYTES # BLD AUTO: 0.56 K/UL
MONOCYTES NFR BLD AUTO: 5.5 %
N GONORRHOEA RRNA SPEC QL NAA+PROBE: NOT DETECTED
NEUTROPHILS # BLD AUTO: 6.38 K/UL
NEUTROPHILS NFR BLD AUTO: 63.2 %
PLATELET # BLD AUTO: 379 K/UL
RBC # BLD: 3.61 M/UL
RBC # FLD: 11.9 %
SOURCE TP AMPLIFICATION: NORMAL
T PALLIDUM AB SER QL IA: NEGATIVE
TSH SERPL-ACNC: 0.6 UIU/ML
WBC # FLD AUTO: 10.1 K/UL

## 2024-05-03 LAB
B19V IGG SER QL IA: 5.61 INDEX
B19V IGG+IGM SER-IMP: NORMAL
B19V IGG+IGM SER-IMP: POSITIVE
B19V IGM FLD-ACNC: 0.14 INDEX
B19V IGM SER-ACNC: NEGATIVE
BACTERIA UR CULT: NORMAL
HBV SURFACE AG SER QL: NONREACTIVE
HCV AB SER QL: NONREACTIVE
HCV S/CO RATIO: 0.11 S/CO
LEAD BLD-MCNC: <1 UG/DL
MEV IGG FLD QL IA: 233 AU/ML
MEV IGG+IGM SER-IMP: POSITIVE
MUV AB SER-ACNC: POSITIVE
MUV IGG SER QL IA: 55.3 AU/ML
RUBV IGG FLD-ACNC: 3.1 INDEX
RUBV IGG FLD-ACNC: 3.1 INDEX
RUBV IGG SER-IMP: POSITIVE
RUBV IGG SER-IMP: POSITIVE
VZV AB TITR SER: POSITIVE
VZV IGG SER IF-ACNC: 196.1 INDEX

## 2024-05-06 LAB — CYTOLOGY CVX/VAG DOC THIN PREP: NORMAL

## 2024-05-07 NOTE — OB PROVIDER H&P - PRETERM DELIVERIES, OB PROFILE
What Type Of Note Output Would You Prefer (Optional)?: Bullet Format How Severe Is Your Skin Lesion?: mild Has Your Skin Lesion Been Treated?: not been treated Is This A New Presentation, Or A Follow-Up?: Skin Lesion Spontaneous, unlabored and symmetrical 0

## 2024-05-08 ENCOUNTER — NON-APPOINTMENT (OUTPATIENT)
Age: 34
End: 2024-05-08

## 2024-05-09 ENCOUNTER — NON-APPOINTMENT (OUTPATIENT)
Age: 34
End: 2024-05-09

## 2024-05-13 ENCOUNTER — APPOINTMENT (OUTPATIENT)
Dept: OBGYN | Facility: CLINIC | Age: 34
End: 2024-05-13
Payer: COMMERCIAL

## 2024-05-13 DIAGNOSIS — Z34.81 ENCOUNTER FOR SUPERVISION OF OTHER NORMAL PREGNANCY, FIRST TRIMESTER: ICD-10-CM

## 2024-05-13 PROCEDURE — 0500F INITIAL PRENATAL CARE VISIT: CPT

## 2024-05-13 PROCEDURE — 76817 TRANSVAGINAL US OBSTETRIC: CPT

## 2024-05-13 PROCEDURE — 36415 COLL VENOUS BLD VENIPUNCTURE: CPT

## 2024-05-15 ENCOUNTER — NON-APPOINTMENT (OUTPATIENT)
Age: 34
End: 2024-05-15

## 2024-05-21 ENCOUNTER — TRANSCRIPTION ENCOUNTER (OUTPATIENT)
Age: 34
End: 2024-05-21

## 2024-05-21 ENCOUNTER — NON-APPOINTMENT (OUTPATIENT)
Age: 34
End: 2024-05-21

## 2024-05-30 ENCOUNTER — APPOINTMENT (OUTPATIENT)
Dept: OBGYN | Facility: CLINIC | Age: 34
End: 2024-05-30
Payer: COMMERCIAL

## 2024-05-30 ENCOUNTER — ASOB RESULT (OUTPATIENT)
Age: 34
End: 2024-05-30

## 2024-05-30 PROCEDURE — 0502F SUBSEQUENT PRENATAL CARE: CPT

## 2024-05-30 PROCEDURE — 76801 OB US < 14 WKS SINGLE FETUS: CPT

## 2024-05-30 PROCEDURE — 76813 OB US NUCHAL MEAS 1 GEST: CPT
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
## 2024-06-24 ENCOUNTER — NON-APPOINTMENT (OUTPATIENT)
Age: 34
End: 2024-06-24

## 2024-06-24 ENCOUNTER — APPOINTMENT (OUTPATIENT)
Dept: OBGYN | Facility: CLINIC | Age: 34
End: 2024-06-24
Payer: COMMERCIAL

## 2024-06-24 DIAGNOSIS — Z34.82 ENCOUNTER FOR SUPERVISION OF OTHER NORMAL PREGNANCY, SECOND TRIMESTER: ICD-10-CM

## 2024-06-24 DIAGNOSIS — R10.2 PELVIC AND PERINEAL PAIN: ICD-10-CM

## 2024-06-24 PROCEDURE — 36415 COLL VENOUS BLD VENIPUNCTURE: CPT

## 2024-06-24 PROCEDURE — 0502F SUBSEQUENT PRENATAL CARE: CPT

## 2024-06-25 ENCOUNTER — TRANSCRIPTION ENCOUNTER (OUTPATIENT)
Age: 34
End: 2024-06-25

## 2024-06-25 LAB
AF-AFP DISCLAIMER: NORMAL
AF-AFP MOM: 1.01
AFP CONCENTRATION: 35.05 NG/ML
AFP INTERPRETATION: NORMAL
AFP MOM CUT-OFF: 2.5
AFP PERCENTILE: 51.6
AFP SCREENING RESULT: NORMAL
AFTER SCREENING RISK OPEN SPINA BIFIDA: NORMAL
BEFORE SCREENING RISK OPEN SPINA BIFIDA: NORMAL
CARBAMAZEPINE?: NORMAL
CURRENT SMOKER: NORMAL
ESTIMATED DUE DATE: NORMAL
EXTREME ANALYTE ALERT: NO
FAMILY HISTORY OPEN SPINA BIFIDA: NORMAL
GESTATIONAL  AGE: NORMAL
GESTATIONAL AGE METHOD: NORMAL
INSULIN DEPEND DIABETES: NORMAL
MATERNAL WGT: 127
MULTIPLE PREGNANCY STATUS: NORMAL
RACE/ETHNICITY: NORMAL
VALPROIC ACID?: NORMAL

## 2024-07-02 ENCOUNTER — NON-APPOINTMENT (OUTPATIENT)
Age: 34
End: 2024-07-02

## 2024-07-10 ENCOUNTER — TRANSCRIPTION ENCOUNTER (OUTPATIENT)
Age: 34
End: 2024-07-10

## 2024-07-22 ENCOUNTER — APPOINTMENT (OUTPATIENT)
Dept: ANTEPARTUM | Facility: CLINIC | Age: 34
End: 2024-07-22

## 2024-07-22 ENCOUNTER — NON-APPOINTMENT (OUTPATIENT)
Age: 34
End: 2024-07-22

## 2024-07-22 ENCOUNTER — ASOB RESULT (OUTPATIENT)
Age: 34
End: 2024-07-22

## 2024-07-22 PROCEDURE — 76817 TRANSVAGINAL US OBSTETRIC: CPT

## 2024-07-22 PROCEDURE — 76811 OB US DETAILED SNGL FETUS: CPT | Mod: 59

## 2024-07-23 ENCOUNTER — NON-APPOINTMENT (OUTPATIENT)
Age: 34
End: 2024-07-23

## 2024-07-23 ENCOUNTER — APPOINTMENT (OUTPATIENT)
Dept: OBGYN | Facility: CLINIC | Age: 34
End: 2024-07-23
Payer: COMMERCIAL

## 2024-07-23 DIAGNOSIS — Z34.82 ENCOUNTER FOR SUPERVISION OF OTHER NORMAL PREGNANCY, SECOND TRIMESTER: ICD-10-CM

## 2024-07-23 PROCEDURE — 0502F SUBSEQUENT PRENATAL CARE: CPT

## 2024-09-11 ENCOUNTER — APPOINTMENT (OUTPATIENT)
Dept: OBGYN | Facility: CLINIC | Age: 34
End: 2024-09-11
Payer: COMMERCIAL

## 2024-09-11 DIAGNOSIS — Z34.83 ENCOUNTER FOR SUPERVISION OF OTHER NORMAL PREGNANCY, THIRD TRIMESTER: ICD-10-CM

## 2024-09-11 PROCEDURE — 0502F SUBSEQUENT PRENATAL CARE: CPT

## 2024-09-11 PROCEDURE — 36415 COLL VENOUS BLD VENIPUNCTURE: CPT

## 2024-09-12 LAB
25(OH)D3 SERPL-MCNC: 41.3 NG/ML
BASOPHILS # BLD AUTO: 0.03 K/UL
BASOPHILS NFR BLD AUTO: 0.3 %
BLD GP AB SCN SERPL QL: NORMAL
EOSINOPHIL # BLD AUTO: 0.13 K/UL
EOSINOPHIL NFR BLD AUTO: 1.5 %
FERRITIN SERPL-MCNC: 16 NG/ML
GLUCOSE 1H P 50 G GLC PO SERPL-MCNC: 112 MG/DL
HCT VFR BLD CALC: 32.2 %
HGB BLD-MCNC: 10.6 G/DL
HIV1+2 AB SPEC QL IA.RAPID: NONREACTIVE
IMM GRANULOCYTES NFR BLD AUTO: 0.5 %
LYMPHOCYTES # BLD AUTO: 1.63 K/UL
LYMPHOCYTES NFR BLD AUTO: 18.8 %
MAN DIFF?: NORMAL
MCHC RBC-ENTMCNC: 32.9 GM/DL
MCHC RBC-ENTMCNC: 33.7 PG
MCV RBC AUTO: 102.2 FL
MONOCYTES # BLD AUTO: 0.5 K/UL
MONOCYTES NFR BLD AUTO: 5.8 %
NEUTROPHILS # BLD AUTO: 6.36 K/UL
NEUTROPHILS NFR BLD AUTO: 73.1 %
PLATELET # BLD AUTO: 343 K/UL
RBC # BLD: 3.15 M/UL
RBC # FLD: 12.2 %
T PALLIDUM AB SER QL IA: NEGATIVE
WBC # FLD AUTO: 8.69 K/UL

## 2024-10-01 ENCOUNTER — ASOB RESULT (OUTPATIENT)
Age: 34
End: 2024-10-01

## 2024-10-01 ENCOUNTER — APPOINTMENT (OUTPATIENT)
Dept: ANTEPARTUM | Facility: CLINIC | Age: 34
End: 2024-10-01

## 2024-10-01 ENCOUNTER — NON-APPOINTMENT (OUTPATIENT)
Age: 34
End: 2024-10-01

## 2024-10-01 PROCEDURE — 76819 FETAL BIOPHYS PROFIL W/O NST: CPT | Mod: 59

## 2024-10-01 PROCEDURE — 76816 OB US FOLLOW-UP PER FETUS: CPT

## 2024-10-01 PROCEDURE — 76817 TRANSVAGINAL US OBSTETRIC: CPT

## 2024-10-01 PROCEDURE — 76820 UMBILICAL ARTERY ECHO: CPT | Mod: 59

## 2024-10-02 ENCOUNTER — APPOINTMENT (OUTPATIENT)
Dept: OBGYN | Facility: CLINIC | Age: 34
End: 2024-10-02
Payer: COMMERCIAL

## 2024-10-02 PROCEDURE — 0502F SUBSEQUENT PRENATAL CARE: CPT

## 2024-10-02 PROCEDURE — 90471 IMMUNIZATION ADMIN: CPT

## 2024-10-02 PROCEDURE — 90715 TDAP VACCINE 7 YRS/> IM: CPT

## 2024-10-15 ENCOUNTER — APPOINTMENT (OUTPATIENT)
Dept: OBGYN | Facility: CLINIC | Age: 34
End: 2024-10-15
Payer: COMMERCIAL

## 2024-10-15 DIAGNOSIS — Z23 ENCOUNTER FOR IMMUNIZATION: ICD-10-CM

## 2024-10-15 PROCEDURE — 0502F SUBSEQUENT PRENATAL CARE: CPT

## 2024-10-15 PROCEDURE — 90471 IMMUNIZATION ADMIN: CPT

## 2024-10-15 PROCEDURE — 90656 IIV3 VACC NO PRSV 0.5 ML IM: CPT

## 2024-10-29 ENCOUNTER — APPOINTMENT (OUTPATIENT)
Dept: ANTEPARTUM | Facility: CLINIC | Age: 34
End: 2024-10-29

## 2024-10-29 ENCOUNTER — NON-APPOINTMENT (OUTPATIENT)
Age: 34
End: 2024-10-29

## 2024-10-29 ENCOUNTER — ASOB RESULT (OUTPATIENT)
Age: 34
End: 2024-10-29

## 2024-10-29 PROCEDURE — 76819 FETAL BIOPHYS PROFIL W/O NST: CPT | Mod: 59

## 2024-10-29 PROCEDURE — 76816 OB US FOLLOW-UP PER FETUS: CPT

## 2024-10-29 PROCEDURE — 76820 UMBILICAL ARTERY ECHO: CPT | Mod: 59

## 2024-10-30 ENCOUNTER — APPOINTMENT (OUTPATIENT)
Dept: OBGYN | Facility: CLINIC | Age: 34
End: 2024-10-30
Payer: COMMERCIAL

## 2024-10-30 DIAGNOSIS — Z34.83 ENCOUNTER FOR SUPERVISION OF OTHER NORMAL PREGNANCY, THIRD TRIMESTER: ICD-10-CM

## 2024-10-30 PROCEDURE — 0502F SUBSEQUENT PRENATAL CARE: CPT

## 2024-11-06 PROCEDURE — 90834 PSYTX W PT 45 MINUTES: CPT | Mod: 93

## 2024-11-12 ENCOUNTER — APPOINTMENT (OUTPATIENT)
Dept: ANTEPARTUM | Facility: CLINIC | Age: 34
End: 2024-11-12

## 2024-11-13 ENCOUNTER — APPOINTMENT (OUTPATIENT)
Dept: OBGYN | Facility: CLINIC | Age: 34
End: 2024-11-13
Payer: COMMERCIAL

## 2024-11-13 DIAGNOSIS — Z34.83 ENCOUNTER FOR SUPERVISION OF OTHER NORMAL PREGNANCY, THIRD TRIMESTER: ICD-10-CM

## 2024-11-13 PROCEDURE — 0502F SUBSEQUENT PRENATAL CARE: CPT

## 2024-11-16 LAB
GP B STREP DNA SPEC QL NAA+PROBE: NOT DETECTED
SOURCE GBS: NORMAL

## 2024-11-20 ENCOUNTER — NON-APPOINTMENT (OUTPATIENT)
Age: 34
End: 2024-11-20

## 2024-11-20 ENCOUNTER — APPOINTMENT (OUTPATIENT)
Dept: OBGYN | Facility: CLINIC | Age: 34
End: 2024-11-20
Payer: COMMERCIAL

## 2024-11-20 PROCEDURE — 0502F SUBSEQUENT PRENATAL CARE: CPT

## 2024-11-27 ENCOUNTER — APPOINTMENT (OUTPATIENT)
Dept: OBGYN | Facility: CLINIC | Age: 34
End: 2024-11-27
Payer: COMMERCIAL

## 2024-11-27 PROCEDURE — 0502F SUBSEQUENT PRENATAL CARE: CPT

## 2024-12-03 ENCOUNTER — APPOINTMENT (OUTPATIENT)
Dept: OBGYN | Facility: CLINIC | Age: 34
End: 2024-12-03
Payer: COMMERCIAL

## 2024-12-03 DIAGNOSIS — Z34.83 ENCOUNTER FOR SUPERVISION OF OTHER NORMAL PREGNANCY, THIRD TRIMESTER: ICD-10-CM

## 2024-12-03 PROCEDURE — 0502F SUBSEQUENT PRENATAL CARE: CPT

## 2024-12-04 ENCOUNTER — INPATIENT (INPATIENT)
Facility: HOSPITAL | Age: 34
LOS: 0 days | Discharge: HOME | DRG: 951 | End: 2024-12-05
Attending: OBSTETRICS & GYNECOLOGY | Admitting: OBSTETRICS & GYNECOLOGY
Payer: COMMERCIAL

## 2024-12-04 VITALS — OXYGEN SATURATION: 100 % | HEART RATE: 77 BPM

## 2024-12-04 DIAGNOSIS — Z98.890 OTHER SPECIFIED POSTPROCEDURAL STATES: Chronic | ICD-10-CM

## 2024-12-04 DIAGNOSIS — Z34.83 ENCOUNTER FOR SUPERVISION OF OTHER NORMAL PREGNANCY, THIRD TRIMESTER: ICD-10-CM

## 2024-12-04 DIAGNOSIS — F31.9 BIPOLAR DISORDER, UNSPECIFIED: Chronic | ICD-10-CM

## 2024-12-04 LAB
BASOPHILS # BLD AUTO: 0.06 K/UL — SIGNIFICANT CHANGE UP (ref 0–0.2)
BASOPHILS NFR BLD AUTO: 0.7 % — SIGNIFICANT CHANGE UP (ref 0–2)
EOSINOPHIL # BLD AUTO: 0.21 K/UL — SIGNIFICANT CHANGE UP (ref 0–0.5)
EOSINOPHIL NFR BLD AUTO: 2.4 % — SIGNIFICANT CHANGE UP (ref 0–6)
HCT VFR BLD CALC: 31.7 % — LOW (ref 34.5–45)
HGB BLD-MCNC: 10.6 G/DL — LOW (ref 11.5–15.5)
IMM GRANULOCYTES NFR BLD AUTO: 0.7 % — SIGNIFICANT CHANGE UP (ref 0–0.9)
LYMPHOCYTES # BLD AUTO: 2.17 K/UL — SIGNIFICANT CHANGE UP (ref 1–3.3)
LYMPHOCYTES # BLD AUTO: 25.2 % — SIGNIFICANT CHANGE UP (ref 13–44)
MCHC RBC-ENTMCNC: 31.6 PG — SIGNIFICANT CHANGE UP (ref 27–34)
MCHC RBC-ENTMCNC: 33.4 G/DL — SIGNIFICANT CHANGE UP (ref 32–36)
MCV RBC AUTO: 94.6 FL — SIGNIFICANT CHANGE UP (ref 80–100)
MONOCYTES # BLD AUTO: 0.63 K/UL — SIGNIFICANT CHANGE UP (ref 0–0.9)
MONOCYTES NFR BLD AUTO: 7.3 % — SIGNIFICANT CHANGE UP (ref 2–14)
NEUTROPHILS # BLD AUTO: 5.49 K/UL — SIGNIFICANT CHANGE UP (ref 1.8–7.4)
NEUTROPHILS NFR BLD AUTO: 63.7 % — SIGNIFICANT CHANGE UP (ref 43–77)
NRBC # BLD: 0 /100 WBCS — SIGNIFICANT CHANGE UP (ref 0–0)
PLATELET # BLD AUTO: 369 K/UL — SIGNIFICANT CHANGE UP (ref 150–400)
RBC # BLD: 3.35 M/UL — LOW (ref 3.8–5.2)
RBC # FLD: 12.6 % — SIGNIFICANT CHANGE UP (ref 10.3–14.5)
T PALLIDUM AB TITR SER: NEGATIVE — SIGNIFICANT CHANGE UP
WBC # BLD: 8.62 K/UL — SIGNIFICANT CHANGE UP (ref 3.8–10.5)
WBC # FLD AUTO: 8.62 K/UL — SIGNIFICANT CHANGE UP (ref 3.8–10.5)

## 2024-12-04 PROCEDURE — 59400 OBSTETRICAL CARE: CPT

## 2024-12-04 RX ORDER — BENZOCAINE 10 %
1 OINTMENT (GRAM) TOPICAL EVERY 6 HOURS
Refills: 0 | Status: DISCONTINUED | OUTPATIENT
Start: 2024-12-04 | End: 2024-12-05

## 2024-12-04 RX ORDER — ONDANSETRON HYDROCHLORIDE 4 MG/1
4 TABLET, FILM COATED ORAL ONCE
Refills: 0 | Status: DISCONTINUED | OUTPATIENT
Start: 2024-12-04 | End: 2024-12-05

## 2024-12-04 RX ORDER — TETANUS TOXOID, REDUCED DIPHTHERIA TOXOID AND ACELLULAR PERTUSSIS VACCINE, ADSORBED 5; 2.5; 8; 8; 2.5 [IU]/.5ML; [IU]/.5ML; UG/.5ML; UG/.5ML; UG/.5ML
0.5 SUSPENSION INTRAMUSCULAR ONCE
Refills: 0 | Status: DISCONTINUED | OUTPATIENT
Start: 2024-12-04 | End: 2024-12-05

## 2024-12-04 RX ORDER — 0.9 % SODIUM CHLORIDE 0.9 %
1000 INTRAVENOUS SOLUTION INTRAVENOUS
Refills: 0 | Status: DISCONTINUED | OUTPATIENT
Start: 2024-12-04 | End: 2024-12-04

## 2024-12-04 RX ORDER — KETOROLAC TROMETHAMINE 30 MG/ML
30 INJECTION INTRAMUSCULAR; INTRAVENOUS ONCE
Refills: 0 | Status: DISCONTINUED | OUTPATIENT
Start: 2024-12-04 | End: 2024-12-04

## 2024-12-04 RX ORDER — SIMETHICONE 125 MG
80 CAPSULE ORAL EVERY 4 HOURS
Refills: 0 | Status: DISCONTINUED | OUTPATIENT
Start: 2024-12-04 | End: 2024-12-05

## 2024-12-04 RX ORDER — TRISODIUM CITRATE DIHYDRATE AND CITRIC ACID MONOHYDRATE 500; 334 MG/5ML; MG/5ML
15 SOLUTION ORAL EVERY 6 HOURS
Refills: 0 | Status: DISCONTINUED | OUTPATIENT
Start: 2024-12-04 | End: 2024-12-04

## 2024-12-04 RX ORDER — LAMOTRIGINE 50 MG/1
150 TABLET, EXTENDED RELEASE ORAL DAILY
Refills: 0 | Status: DISCONTINUED | OUTPATIENT
Start: 2024-12-04 | End: 2024-12-05

## 2024-12-04 RX ORDER — IBUPROFEN 200 MG
600 TABLET ORAL EVERY 6 HOURS
Refills: 0 | Status: COMPLETED | OUTPATIENT
Start: 2024-12-04 | End: 2025-11-02

## 2024-12-04 RX ORDER — FAMOTIDINE 20 MG/1
20 TABLET, FILM COATED ORAL ONCE
Refills: 0 | Status: COMPLETED | OUTPATIENT
Start: 2024-12-04 | End: 2024-12-04

## 2024-12-04 RX ORDER — OXYCODONE HYDROCHLORIDE 30 MG/1
5 TABLET ORAL
Refills: 0 | Status: DISCONTINUED | OUTPATIENT
Start: 2024-12-04 | End: 2024-12-05

## 2024-12-04 RX ORDER — PRAMOXINE HYDROCHLORIDE 1 MG/ML
1 LOTION TOPICAL EVERY 4 HOURS
Refills: 0 | Status: DISCONTINUED | OUTPATIENT
Start: 2024-12-04 | End: 2024-12-05

## 2024-12-04 RX ORDER — CHLORHEXIDINE GLUCONATE 1.2 MG/ML
1 RINSE ORAL DAILY
Refills: 0 | Status: DISCONTINUED | OUTPATIENT
Start: 2024-12-04 | End: 2024-12-04

## 2024-12-04 RX ORDER — ONDANSETRON HYDROCHLORIDE 4 MG/1
4 TABLET, FILM COATED ORAL ONCE
Refills: 0 | Status: COMPLETED | OUTPATIENT
Start: 2024-12-04 | End: 2024-12-04

## 2024-12-04 RX ORDER — WITCH HAZEL 50 G/100ML
1 SOLUTION RECTAL EVERY 4 HOURS
Refills: 0 | Status: DISCONTINUED | OUTPATIENT
Start: 2024-12-04 | End: 2024-12-05

## 2024-12-04 RX ORDER — HYDROCORTISONE BUTYRATE 0.1 %
1 CREAM (GRAM) TOPICAL EVERY 6 HOURS
Refills: 0 | Status: DISCONTINUED | OUTPATIENT
Start: 2024-12-04 | End: 2024-12-05

## 2024-12-04 RX ORDER — DIPHENHYDRAMINE HCL 25 MG
25 CAPSULE ORAL EVERY 6 HOURS
Refills: 0 | Status: DISCONTINUED | OUTPATIENT
Start: 2024-12-04 | End: 2024-12-05

## 2024-12-04 RX ORDER — ACETAMINOPHEN 500MG 500 MG/1
975 TABLET, COATED ORAL
Refills: 0 | Status: DISCONTINUED | OUTPATIENT
Start: 2024-12-04 | End: 2024-12-05

## 2024-12-04 RX ORDER — LANOLIN 72 %
1 OINTMENT (GRAM) TOPICAL EVERY 6 HOURS
Refills: 0 | Status: DISCONTINUED | OUTPATIENT
Start: 2024-12-04 | End: 2024-12-05

## 2024-12-04 RX ORDER — ALBUTEROL 90 MCG
2 AEROSOL (GRAM) INHALATION EVERY 6 HOURS
Refills: 0 | Status: DISCONTINUED | OUTPATIENT
Start: 2024-12-04 | End: 2024-12-05

## 2024-12-04 RX ORDER — BUPROPION HCL 100 MG
150 TABLET ORAL DAILY
Refills: 0 | Status: DISCONTINUED | OUTPATIENT
Start: 2024-12-04 | End: 2024-12-05

## 2024-12-04 RX ORDER — DIBUCAINE 1 %
1 OINTMENT (GRAM) TOPICAL EVERY 6 HOURS
Refills: 0 | Status: DISCONTINUED | OUTPATIENT
Start: 2024-12-04 | End: 2024-12-05

## 2024-12-04 RX ORDER — OXYCODONE HYDROCHLORIDE 30 MG/1
5 TABLET ORAL ONCE
Refills: 0 | Status: DISCONTINUED | OUTPATIENT
Start: 2024-12-04 | End: 2024-12-05

## 2024-12-04 RX ORDER — SODIUM CHLORIDE 9 MG/ML
3 INJECTION, SOLUTION INTRAMUSCULAR; INTRAVENOUS; SUBCUTANEOUS EVERY 8 HOURS
Refills: 0 | Status: DISCONTINUED | OUTPATIENT
Start: 2024-12-04 | End: 2024-12-05

## 2024-12-04 RX ORDER — IBUPROFEN 200 MG
600 TABLET ORAL EVERY 6 HOURS
Refills: 0 | Status: DISCONTINUED | OUTPATIENT
Start: 2024-12-04 | End: 2024-12-05

## 2024-12-04 RX ORDER — .BETA.-CAROTENE, SODIUM ACETATE, ASCORBIC ACID, CHOLECALCIFEROL, .ALPHA.-TOCOPHEROL ACETATE, DL-, THIAMINE MONONITRATE, RIBOFLAVIN, NIACINAMIDE, PYRIDOXINE HYDROCHLORIDE, FOLIC ACID, CYANOCOBALAMIN, CALCIUM CARBONATE, FERROUS FUMARATE, ZINC OXIDE AND CUPRIC OXIDE 2000; 2000; 120; 400; 22; 1.84; 3; 20; 10; 1; 12; 200; 27; 25; 2 [IU]/1; [IU]/1; MG/1; [IU]/1; MG/1; MG/1; MG/1; MG/1; MG/1; MG/1; UG/1; MG/1; MG/1; MG/1; MG/1
1 TABLET ORAL DAILY
Refills: 0 | Status: DISCONTINUED | OUTPATIENT
Start: 2024-12-04 | End: 2024-12-05

## 2024-12-04 RX ADMIN — Medication 2 MILLIUNIT(S)/MIN: at 08:56

## 2024-12-04 RX ADMIN — ONDANSETRON HYDROCHLORIDE 4 MILLIGRAM(S): 4 TABLET, FILM COATED ORAL at 03:40

## 2024-12-04 RX ADMIN — FAMOTIDINE 20 MILLIGRAM(S): 20 TABLET, FILM COATED ORAL at 03:40

## 2024-12-04 RX ADMIN — KETOROLAC TROMETHAMINE 30 MILLIGRAM(S): 30 INJECTION INTRAMUSCULAR; INTRAVENOUS at 17:09

## 2024-12-04 RX ADMIN — Medication 4 MILLIUNIT(S)/MIN: at 07:00

## 2024-12-04 RX ADMIN — LAMOTRIGINE 150 MILLIGRAM(S): 50 TABLET, EXTENDED RELEASE ORAL at 06:46

## 2024-12-04 RX ADMIN — Medication 125 MILLILITER(S): at 03:50

## 2024-12-04 RX ADMIN — Medication 150 MILLIGRAM(S): at 06:46

## 2024-12-04 NOTE — OB NEONATOLOGY/PEDIATRICIAN DELIVERY SUMMARY - NSPEDSNEONOTESA_OBGYN_ALL_OB_FT
Code 100 called. 39.2 week old infant born to a 35 y/o  mother by vaginal delivery. Maternal labs: Blood Type:  A positive, Hep B negative, Hep C negative, Rubella Immune, RPR Negative, HIV negative, GBS Negative (). Maternal PMHX and PSHx includes: ovarian cyst, breast reduction, bipolar II (Wellbutrin and Lamictal), IBS, history of Meniere's. Prenatal course uncomplicated. AROM at 14:15, clear fluids. Arrived at 45 seconds of life. Infant on warmer with good cry, tone, and color. Infant suctioned and stimulated. Hat was placed. EOS Score=0.05. Admit to nursery.  Code 100 called. 39.2 week old infant born to a 35 y/o  mother by vaginal delivery. Maternal labs: Blood Type:  A positive, Hep B negative, Hep C negative, Rubella Immune, RPR Negative, HIV negative, GBS Negative (). Maternal PMHX and PSHx includes: ovarian cyst, breast reduction, bipolar II (Wellbutrin and Lamictal), IBS, history of Meniere's. Prenatal course uncomplicated. AROM at 14:15, clear fluids. Nuchal Cord x 1. Arrived at 45 seconds of life. Infant on warmer with good cry, tone, and color. Infant suctioned and stimulated. Hat was placed. EOS Score=0.05. Admit to nursery.

## 2024-12-04 NOTE — OB RN PATIENT PROFILE - NS_FINALEDD_OBGYN_ALL_OB_DT
I reviewed the H&P, I examined the patient, and there are no changes in the patient's condition.    09-Dec-2024

## 2024-12-04 NOTE — OB RN DELIVERY SUMMARY - BABY A: APGAR 5 MIN SCORE, DELIVERY
[facility] phone call message- general phone call:    Reason for call: Daughter Kelsey called over to [facility] to get Oximetry results but it was done with [employer] machine, she is not sure where it was from. Daughter was told results would go through [facility] so she called keri to get results so Cholo is just helping out daughter to call the clinic to have the clinic call daughter Kelsey who is a proxy in the chart with results or to put it through [facility] to they can view the results. Patient does have an appt coming up on 12/11 for results. Kelsey's number to be reach at is . The call is to have the clinic contact daughter back with results or to have result go into [facility], if any questions can call Cholo back at above number. Please call and advise.     Return call needed: Yes    OK to leave a message on voice mail?     Primary language: English      needed? No    Call taken on November 29, 2018 at 3:38 PM by Stevan Kothari   9

## 2024-12-04 NOTE — OB NEONATOLOGY/PEDIATRICIAN DELIVERY SUMMARY - NS_FINALEDD_OBGYN_ALL_OB_DT
"Chief Complaint   Patient presents with     Follow Up     review imaging       Height 1.6 m (5' 3\"), weight 108 kg (238 lb). Body mass index is 42.16 kg/m .    Patient Active Problem List   Diagnosis     Chronic rhinitis     Psoriasis     Latent tuberculosis     Hypothyroidism     Secondary hyperparathyroidism (H)     Alcohol use disorder, severe, in sustained remission (H)     MRSA (methicillin resistant staph aureus) culture positive     Esophageal dysmotility     Left ureteral stone     Nephrolithiasis     Morbid obesity (H)       Allergies   Allergen Reactions     Avelox [Moxifloxacin] Anaphylaxis     Requiring epinephrine     Cefaclor Hives     Tolerated cephalexin, tolerated cefepime     Sulfa Drugs Hives       Current Outpatient Medications   Medication Sig Dispense Refill     acetaminophen (TYLENOL) 325 MG tablet Take 325-650 mg by mouth every 4 hours as needed for mild pain       albuterol (PROAIR HFA/PROVENTIL HFA/VENTOLIN HFA) 108 (90 Base) MCG/ACT inhaler Inhale 2 puffs into the lungs every 4 hours as needed for shortness of breath / dyspnea or wheezing (VENTOLIN)       buPROPion (WELLBUTRIN XL) 300 MG 24 hr tablet Take 300 mg by mouth every morning        calcitonin, salmon, (MIACALCIN) 200 UNIT/ACT nasal spray Spray 1 spray into one nostril alternating nostrils daily Alternate nostril each day.       cycloSPORINE (RESTASIS) 0.05 % ophthalmic emulsion Place 1 drop into both eyes 2 times daily       diltiazem (CARDIZEM) 60 MG tablet Take 60 mg by mouth 3 times daily       doxycycline hyclate (VIBRAMYCIN) 50 MG capsule Take 50 mg by mouth every evening       fentaNYL (DURAGESIC) 12 mcg/hr 72 hr patch Place 1 patch onto the skin every 72 hours remove old patch.       fentaNYL (DURAGESIC) 25 mcg/hr 72 hr patch Place 1 patch onto the skin every 72 hours remove old patch.       ferrous sulfate (FEROSUL) 325 (65 Fe) MG tablet Take 325 mg by mouth daily (with breakfast)       fish oil-omega-3 fatty acids 1000 " MG capsule Take 2 g by mouth 2 times daily       fluticasone (FLONASE) 50 MCG/ACT nasal spray Spray 2 sprays into both nostrils daily        fluticasone-salmeterol (ADVAIR) 100-50 MCG/DOSE inhaler Inhale 1 puff into the lungs every 12 hours       gabapentin (NEURONTIN) 300 MG capsule Take 900 mg by mouth 3 times daily (300MG X 3 = 900MG)       leflunomide (ARAVA) 20 MG tablet Take 20 mg by mouth every evening        levothyroxine (SYNTHROID/LEVOTHROID) 75 MCG tablet Take 75 mcg by mouth daily       lurasidone (LATUDA) 20 MG TABS tablet Take 20 mg by mouth At Bedtime        metFORMIN (GLUCOPHAGE-XR) 500 MG 24 hr tablet Take 500 mg by mouth daily (with breakfast) (500MG X 2 = 1,000MG)       methylPREDNISolone (MEDROL) 4 MG tablet Take 8 mg by mouth daily        modafinil (PROVIGIL) 200 MG tablet Take 200 mg by mouth daily as needed        multivitamin, therapeutic (THERA-VIT) TABS tablet Take 1 tablet by mouth daily       NONFORMULARY Take 500 mg by mouth daily as needed Rajat leaf extract       nystatin (MYCOSTATIN) 973140 UNIT/GM external cream Apply topically 2 times daily as needed for dry skin        nystatin (MYCOSTATIN) 131919 UNIT/GM external powder Apply topically 4 times daily as needed Under abdominal wall        omeprazole (PRILOSEC) 20 MG DR capsule Take 20 mg by mouth 2 times daily        polyethylene glycol (MIRALAX) 17 g packet Take 1 packet by mouth daily as needed        polyvinyl alcohol (LIQUIFILM TEARS) 1.4 % ophthalmic solution Place 1 drop into both eyes 3 times daily as needed for dry eyes       pravastatin (PRAVACHOL) 80 MG tablet Take 80 mg by mouth At Bedtime       secukinumab (COSENTYX) 150 MG/ML Sensoready pen Inject 300 mg Subcutaneous every 28 days       traZODone (DESYREL) 100 MG tablet Take 100 mg by mouth At Bedtime        varenicline (CHANTIX) 1 MG tablet Take 1 mg by mouth 2 times daily       venlafaxine (EFFEXOR-XR) 150 MG 24 hr capsule Take 150 mg by mouth daily       vitamin D2  (ERGOCALCIFEROL) 55897 units (1250 mcg) capsule Take 50,000 Units by mouth four times a week        Xylitol 500 MG DISK Take 1 Dose by mouth every evening as needed       cefdinir (OMNICEF) 300 MG capsule Take 1 capsule (300 mg) by mouth 2 times daily 14 capsule 0     cefdinir (OMNICEF) 300 MG capsule Take 1 capsule (300 mg) by mouth 2 times daily (start after completing IV antibiotics, continue until 3 days after urologic procedure) 42 capsule 0     fluconazole (DIFLUCAN) 200 MG tablet Take 1 tablet (200 mg) by mouth every 72 hours 3 tablet 1     fluconazole (DIFLUCAN) 200 MG tablet Take 200 mg by mouth daily as needed       imiquimod (ALDARA) 5 % external cream Apply topically nightly as needed       Lidocaine (LIDOCARE) 4 % Patch Place 2 patches onto the skin every 12 hours as needed for moderate pain       lidocaine-prilocaine (EMLA) 2.5-2.5 % external cream Apply topically as needed for moderate pain       morphine (MSIR) 15 MG IR tablet Take 15-30 mg by mouth 2 times daily as needed for severe pain        naloxone (NARCAN) 4 MG/0.1ML nasal spray Spray 4 mg into one nostril alternating nostrils once as needed for opioid reversal every 2-3 minutes until assistance arrives       nitroFURantoin macrocrystal-monohydrate (MACROBID) 100 MG capsule Take 1 capsule (100 mg) by mouth 2 times daily 14 capsule 1     senna-docusate (SENOKOT-S/PERICOLACE) 8.6-50 MG tablet Take 1-2 tablets by mouth 2 times daily       solifenacin (VESICARE) 5 MG tablet TAKE 1 TABLET(5 MG) BY MOUTH DAILY AS NEEDED FOR BLADDER SPASMS 90 tablet 0     topiramate (TOPAMAX) 50 MG tablet Take 50 mg by mouth 2 times daily         Social History     Tobacco Use     Smoking status: Former Smoker     Packs/day: 0.50     Types: Cigarettes     Quit date: 2/5/2018     Years since quitting: 3.3     Smokeless tobacco: Never Used   Substance Use Topics     Alcohol use: Not Currently     Frequency: Never     Comment: sober for 6 years     Drug use: No        Chetan Sullivan EMT  6/24/2021  11:05 AM   09-Dec-2024

## 2024-12-04 NOTE — PRE-ANESTHESIA EVALUATION ADULT - NSANTHPMHFT_GEN_ALL_CORE
Pt has back pain and left hip pain during pregnancy, she deneis neurological deficits, or bleeding disorders.     denies Aspirin, antiplatelet or anticoagulation

## 2024-12-04 NOTE — OB PROVIDER LABOR PROGRESS NOTE - ASSESSMENT
35 y/o  @ 39.2 weeks admitted for an elective IOL, now with a category 2 tracing  -Category 2 tracing: pitocin discontinued for tachysystole of contraction pattern, will consider re-starting at 2 milliunits/min.  Position change with good recovery in FHR  -IOL: cervical balloon placement  -EFM/toco    d/w Dr. Suraj Munson NP 33 y/o  @ 39.2 weeks admitted for an elective IOL, now with a category 2 tracing  -Category 2 tracing: pitocin discontinued for tachysystole of contraction pattern, will consider re-starting at 2 milliunits/min.  Position change with good recovery in FHR  -IOL: cervical balloon placement  -EFM/toco    d/w Dr. Suraj Munson NP    Addendum:     Cervical balloon inserted using sterile technique.  60mL instilled in uterine and vaginal balloons without incident.  The patient tolerated the procedure well

## 2024-12-04 NOTE — OB PROVIDER H&P - NSHPPHYSICALEXAM_GEN_ALL_CORE
Objective  – VS  T(C): 36.6 (12-04-24 @ 02:53)  HR: 86 (12-04-24 @ 03:31)  BP: 92/58 (12-04-24 @ 02:55)  RR: 18 (12-04-24 @ 02:53)  SpO2: 99% (12-04-24 @ 03:31)  – PE:   CV: RRR  Pulm: breathing comfortably on RA  Abd: gravid, nontender  Extr: moving all extremities with ease  – VE: //  – FHT: baseline 1, mod variability, +accels, -decels  – [place]: q2-3min, irregular  – EFW: 3200g by sono  – Sono: vertex Objective  – VS  T(C): 36.6 (12-04-24 @ 02:53)  HR: 86 (12-04-24 @ 03:31)  BP: 92/58 (12-04-24 @ 02:55)  RR: 18 (12-04-24 @ 02:53)  SpO2: 99% (12-04-24 @ 03:31)  – PE:   CV: RRR  Pulm: breathing comfortably on RA  Abd: gravid, nontender  Extr: moving all extremities with ease  – VE: 1.5/70/-3  – FHT: baseline 1, mod variability, +accels, -decels  – [place]: q5-7min, irregular  – EFW: 3200g by sono  – Sono: vertex

## 2024-12-04 NOTE — OB RN DELIVERY SUMMARY - NS_CORDVESSELS_OBGYN_ALL_OB

## 2024-12-04 NOTE — OB PROVIDER LABOR PROGRESS NOTE - ASSESSMENT
33 y/o  @ 39.2 weeks admitted for an elective IOL  -IOl: cervical balloon removed, noted to be 3.5/60/-3  pitocin currently infusing at 2 milliunits/min with adequate contractions  s/p VC/CB  - will consider AROM    d/w Dr. Suraj Munson NP

## 2024-12-04 NOTE — OB PROVIDER DELIVERY SUMMARY - NSSELHIDDEN_OBGYN_ALL_OB_FT
[NS_DeliveryAttending1_OBGYN_ALL_OB_FT:[id number]==],[NS_DeliveryAssist1_OBGYN_ALL_OB_FT:[id number]]
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
## 2024-12-04 NOTE — OB PROVIDER H&P - ASSESSMENT
Assessment  34yoF  at 39w2d presenting for elective induction of labor.      Plan  1. Admit to LND. Routine Labs. IVF.  2. IOL w/ vaginal cytotec and pitocin  3. Fetus: cat 1 tracing. VTX. EFW 3200g by sono. Continuous EFM. Sono. No concerns.  4. Prenatal issues: none  5. GBSNeg  6. Pain: Epidural PRN    Plan per attending physician, Dr. Ross Bustillo, PGY-1

## 2024-12-04 NOTE — OB PROVIDER LABOR PROGRESS NOTE - NS_SUBJECTIVE/OBJECTIVE_OBGYN_ALL_OB_FT
NP Chart Note:    The patient was examined for further labor management. Cervical balloon was removed and noted to be 3.5/60/-3.  The patient reports increased pain with contractions, requesting additional medication through the epidural.     ICU Vital Signs Last 24 Hrs  T(C): 36.6 (04 Dec 2024 10:47), Max: 36.6 (04 Dec 2024 02:53)  T(F): 97.88 (04 Dec 2024 10:47), Max: 97.9 (04 Dec 2024 02:53)  HR: 76 (04 Dec 2024 13:06) (64 - 100)  BP: 86/56 (04 Dec 2024 12:35) (86/51 - 109/64)  BP(mean): --  ABP: --  ABP(mean): --  RR: 18 (04 Dec 2024 08:35) (18 - 18)  SpO2: 98% (04 Dec 2024 13:06) (90% - 100%)    O2 Parameters below as of 04 Dec 2024 02:53  Patient On (Oxygen Delivery Method): room air
NP Chart Note:    The patient evaluated for a 3 min prolonged deceleration secondary to tachysystole of contraction pattern.  Pitocin was discontinued with position change.  Good recovery in FHR. The cervical exam is 1.5/50/-3 intact.  Will consider cervical balloon.      ICU Vital Signs Last 24 Hrs  T(C): 36.6 (04 Dec 2024 04:11), Max: 36.6 (04 Dec 2024 02:53)  T(F): 97.9 (04 Dec 2024 02:53), Max: 97.9 (04 Dec 2024 02:53)  HR: 71 (04 Dec 2024 07:36) (64 - 100)  BP: 99/67 (04 Dec 2024 07:35) (86/51 - 109/64)  BP(mean): --  ABP: --  ABP(mean): --  RR: 18 (04 Dec 2024 04:11) (18 - 18)  SpO2: 100% (04 Dec 2024 07:36) (92% - 100%)    O2 Parameters below as of 04 Dec 2024 02:53  Patient On (Oxygen Delivery Method): room air
NP Chart Note:    The patient was examined for further labor management with potential for AROM.  Cervical exam is 4/72/-3 with fetal head well applied. AROM completed with clear fluid.  The patient tolerated the procedure well    ICU Vital Signs Last 24 Hrs  T(C): 36.6 (04 Dec 2024 10:47), Max: 36.6 (04 Dec 2024 02:53)  T(F): 97.88 (04 Dec 2024 10:47), Max: 97.9 (04 Dec 2024 02:53)  HR: 85 (04 Dec 2024 14:42) (64 - 104)  BP: 100/65 (04 Dec 2024 14:35) (86/51 - 109/64)  BP(mean): --  ABP: --  ABP(mean): --  RR: 18 (04 Dec 2024 08:35) (18 - 18)  SpO2: 98% (04 Dec 2024 14:46) (90% - 100%)    O2 Parameters below as of 04 Dec 2024 02:53  Patient On (Oxygen Delivery Method): room air

## 2024-12-04 NOTE — OB PROVIDER LABOR PROGRESS NOTE - ASSESSMENT
35 y/o  @ 39.2 weeks admitted for an elective IOL  -AROM completed clear fluid  -pitocin currently infusing at 2 milliunits/min  -EFM/toco    d/w Dr. Suraj Munson NP

## 2024-12-04 NOTE — OB PROVIDER H&P - NSLOWPPHRISK_OBGYN_A_OB
No previous uterine incision/[place] Pregnancy/Less than or equal to 4 previous vaginal births/No known bleeding disorder/No history of postpartum hemorrhage/No other PPH risks indicated

## 2024-12-04 NOTE — OB PROVIDER H&P - NSPREVIOUSLIVEBIRTHSNOWDEAD_OBGYN_ALL_OB_NU
Medical Necessity Information: It is in your best interest to select a reason for this procedure from the list below. All of these items fulfill various CMS LCD requirements except the new and changing color options. Render Post-Care Instructions In Note?: no Post-Care Instructions: I reviewed with the patient in detail post-care instructions. Patient is to wear sunprotection, and avoid picking at any of the treated lesions. Pt may apply Vaseline to crusted or scabbing areas. Consent: The patient's consent was obtained including but not limited to risks of crusting, scabbing, blistering, scarring, darker or lighter pigmentary change, recurrence, incomplete removal and infection. Detail Level: Detailed Include Z78.9 (Other Specified Conditions Influencing Health Status) As An Associated Diagnosis?: Yes Medical Necessity Clause: This procedure was medically necessary because the lesions that were treated were: 0

## 2024-12-04 NOTE — OB PROVIDER LABOR PROGRESS NOTE - NS_OBIHIFHRDETAILS_OBGYN_ALL_OB_FT
145 moderate variability, + acels, + prolonged deceleration x 5 mins
135 moderate variability, + acels, -decels
135 moderate variability,+ acels, -decels

## 2024-12-04 NOTE — OB PROVIDER DELIVERY SUMMARY - NSPROVIDERDELIVERYNOTE_OBGYN_ALL_OB_FT
Spontaneous vaginal delivery of liveborn infant from OA position.  Head, shoulders, and body delivery easily. Tight nuchal x1.  Infant was suctioned. No mec.  Cord was clamped and cut and infant was passed to peds.  Placenta delivered intact with a 3 vessel cord.  Fundal massage was given and uterine fundus was found to be firm.  Vaginal exam revealed an intact cervix, vaginal walls and sulci.  Patient had a 2 second degree laceration in the perineum that was repaired with a 2.0/3.0 vicryl suture.  Excellent hemostasis was noted.  patient was stable and went to recovery.  Count was correct x2 Spontaneous vaginal delivery of liveborn infant from OA position.  Head, shoulders, and body delivery easily. Tight nuchal x1, infant delilvered through.  Infant was suctioned. No mec.  Cord was clamped and cut and infant was passed to peds.  Placenta delivered intact with a 3 vessel cord.  Fundal massage was given and uterine fundus was found to be firm.  Vaginal exam revealed an intact cervix, vaginal walls and sulci.  Patient had a 2 second degree laceration in the perineum that was repaired with a 2.0/3.0 vicryl suture.  Excellent hemostasis was noted.  patient was stable and went to recovery.  Count was correct x2

## 2024-12-04 NOTE — OB RN DELIVERY SUMMARY - NSSELHIDDEN_OBGYN_ALL_OB_FT
[NS_DeliveryAttending1_OBGYN_ALL_OB_FT:[id number]],[NS_DeliveryAssist1_OBGYN_ALL_OB_FT:[id number]==],[NS_DeliveryRN_OBGYN_ALL_OB_FT:[id number]]

## 2024-12-05 ENCOUNTER — TRANSCRIPTION ENCOUNTER (OUTPATIENT)
Age: 34
End: 2024-12-05

## 2024-12-05 VITALS
HEART RATE: 80 BPM | OXYGEN SATURATION: 97 % | RESPIRATION RATE: 18 BRPM | DIASTOLIC BLOOD PRESSURE: 66 MMHG | TEMPERATURE: 98 F | SYSTOLIC BLOOD PRESSURE: 94 MMHG

## 2024-12-05 PROCEDURE — 86850 RBC ANTIBODY SCREEN: CPT

## 2024-12-05 PROCEDURE — 59050 FETAL MONITOR W/REPORT: CPT

## 2024-12-05 PROCEDURE — 85025 COMPLETE CBC W/AUTO DIFF WBC: CPT

## 2024-12-05 PROCEDURE — 86901 BLOOD TYPING SEROLOGIC RH(D): CPT

## 2024-12-05 PROCEDURE — 36415 COLL VENOUS BLD VENIPUNCTURE: CPT

## 2024-12-05 PROCEDURE — 86900 BLOOD TYPING SEROLOGIC ABO: CPT

## 2024-12-05 PROCEDURE — 86780 TREPONEMA PALLIDUM: CPT

## 2024-12-05 RX ORDER — BUPROPION HCL 100 MG
1 TABLET ORAL
Refills: 0 | DISCHARGE

## 2024-12-05 RX ORDER — IBUPROFEN 200 MG
1 TABLET ORAL
Qty: 0 | Refills: 0 | DISCHARGE
Start: 2024-12-05

## 2024-12-05 RX ORDER — ACETAMINOPHEN 500MG 500 MG/1
3 TABLET, COATED ORAL
Qty: 0 | Refills: 0 | DISCHARGE
Start: 2024-12-05

## 2024-12-05 RX ADMIN — ACETAMINOPHEN 500MG 975 MILLIGRAM(S): 500 TABLET, COATED ORAL at 09:00

## 2024-12-05 RX ADMIN — Medication 600 MILLIGRAM(S): at 17:16

## 2024-12-05 RX ADMIN — ACETAMINOPHEN 500MG 975 MILLIGRAM(S): 500 TABLET, COATED ORAL at 08:23

## 2024-12-05 RX ADMIN — Medication 600 MILLIGRAM(S): at 12:12

## 2024-12-05 RX ADMIN — ACETAMINOPHEN 500MG 975 MILLIGRAM(S): 500 TABLET, COATED ORAL at 02:42

## 2024-12-05 RX ADMIN — ACETAMINOPHEN 500MG 975 MILLIGRAM(S): 500 TABLET, COATED ORAL at 14:47

## 2024-12-05 RX ADMIN — Medication 600 MILLIGRAM(S): at 18:00

## 2024-12-05 RX ADMIN — Medication 600 MILLIGRAM(S): at 06:48

## 2024-12-05 RX ADMIN — Medication 600 MILLIGRAM(S): at 13:00

## 2024-12-05 RX ADMIN — ACETAMINOPHEN 500MG 975 MILLIGRAM(S): 500 TABLET, COATED ORAL at 02:12

## 2024-12-05 RX ADMIN — Medication 600 MILLIGRAM(S): at 00:04

## 2024-12-05 RX ADMIN — Medication 150 MILLIGRAM(S): at 06:48

## 2024-12-05 RX ADMIN — Medication 600 MILLIGRAM(S): at 00:34

## 2024-12-05 RX ADMIN — .BETA.-CAROTENE, SODIUM ACETATE, ASCORBIC ACID, CHOLECALCIFEROL, .ALPHA.-TOCOPHEROL ACETATE, DL-, THIAMINE MONONITRATE, RIBOFLAVIN, NIACINAMIDE, PYRIDOXINE HYDROCHLORIDE, FOLIC ACID, CYANOCOBALAMIN, CALCIUM CARBONATE, FERROUS FUMARATE, ZINC OXIDE AND CUPRIC OXIDE 1 TABLET(S): 2000; 2000; 120; 400; 22; 1.84; 3; 20; 10; 1; 12; 200; 27; 25; 2 TABLET ORAL at 12:12

## 2024-12-05 RX ADMIN — ACETAMINOPHEN 500MG 975 MILLIGRAM(S): 500 TABLET, COATED ORAL at 15:30

## 2024-12-05 NOTE — DISCHARGE NOTE OB - MEDICATION SUMMARY - MEDICATIONS TO STOP TAKING
I will STOP taking the medications listed below when I get home from the hospital:    amoxicillin 500 mg oral capsule  -- 1 cap(s) by mouth 2 times a day (with meals)    Medrol Dosepak 4 mg oral tablet  -- orally 2 times a day (with meals)    Unisom 25 mg oral tablet  -- 1 tab(s) by mouth once a day    Percocet 5 mg-325 mg oral tablet  -- 1 tab(s) by mouth once a day MDD: 1 tab

## 2024-12-05 NOTE — PROGRESS NOTE ADULT - SUBJECTIVE AND OBJECTIVE BOX
Postpartum Note- PPD#1    Allergies    No Known Allergies    Intolerances        Prenatal labs:  Rubella IgG:  Immune             RPR:  Negative           Blood Type: A+    S:Patient is a  34y   G 3   P 2     PPD#1         S/P      Patient w/o complaints, pain is controlled.    Pt is OOB, tolerating PO, passing flatus. Lochia WNL.   Feeding: Breastfeeding    O:  Vital Signs Last 24 Hrs  T(C): 36.6 (05 Dec 2024 05:04), Max: 37 (04 Dec 2024 18:55)  T(F): 97.9 (05 Dec 2024 05:04), Max: 98.6 (04 Dec 2024 18:55)  HR: 83 (05 Dec 2024 05:04) (67 - 104)  BP: 100/65 (05 Dec 2024 05:04) (86/56 - 110/73)  BP(mean): --  RR: 18 (05 Dec 2024 05:04) (18 - 18)  SpO2: 96% (05 Dec 2024 05:04) (86% - 100%)    Parameters below as of 05 Dec 2024 05:04  Patient On (Oxygen Delivery Method): room air         Gen: NAD  CV: rrr s1s2, CTABL  Abdomen: Soft, nontender, non-distended, fundus firm.  Lochia: WNL  Perineum: second degree laceration  Ext: Neg edema, Neg calf tenderness.  Pedal pulses palpated B/L    LABS:    Hemoglobin: 10.6 g/dL ( @ 04:52)      Hematocrit: 31.7 % ( @ 04:52)      A/P:  34y  PPD # 1      S/P      ,  doing well    PMHx: : , 6#5   : SAB, no D&C    Current Issues: none    Increase OOB  Regular diet  PO Pain protocol  AM H&H  Routine Postpartum Care

## 2024-12-05 NOTE — DISCHARGE NOTE OB - CARE PLAN
1 Principal Discharge DX:	Vaginal delivery  Assessment and plan of treatment:	regular diet; activity as tolerated; Appt 6 weeks- please call

## 2024-12-05 NOTE — DISCHARGE NOTE OB - CARE PROVIDER_API CALL
Arielle Ruelas  Obstetrics and Gynecology  [address] [place] [address], [address]  [place] [address]  Phone: [phone]  Fax: [phone]  Follow Up Time:

## 2024-12-05 NOTE — DISCHARGE NOTE OB - PATIENT PORTAL LINK FT
You can access the FollowMyHealth Patient Portal offered by [facility] by registering at the following website: [email or web address]. By joining [facility]’s FollowMyHealth portal, you will also be able to view your health information using other applications (apps) compatible with our system.

## 2024-12-05 NOTE — DISCHARGE NOTE OB - FINANCIAL ASSISTANCE
[facility] provides services at a reduced cost to those who are determined to be eligible through [facility]’s financial assistance program. Information regarding [facility]’s financial assistance program can be found by going to [email or web address] or by calling [phone].

## 2024-12-05 NOTE — DISCHARGE NOTE OB - NS OB DC IMMUNIZATIONS MMR YN
Detail Level: Zone
Detail Level: Detailed
Benzoyl Peroxide Pregnancy And Lactation Text: This medication is Pregnancy Category C. It is unknown if benzoyl peroxide is excreted in breast milk.
Minocycline Counseling: Patient advised regarding possible photosensitivity and discoloration of the teeth, skin, lips, tongue and gums.  Patient instructed to avoid sunlight, if possible.  When exposed to sunlight, patients should wear protective clothing, sunglasses, and sunscreen.  The patient was instructed to call the office immediately if the following severe adverse effects occur:  hearing changes, easy bruising/bleeding, severe headache, or vision changes.  The patient verbalized understanding of the proper use and possible adverse effects of minocycline.  All of the patient's questions and concerns were addressed.
Topical Clindamycin Counseling: Patient counseled that this medication may cause skin irritation or allergic reactions.  In the event of skin irritation, the patient was advised to reduce the amount of the drug applied or use it less frequently.   The patient verbalized understanding of the proper use and possible adverse effects of clindamycin.  All of the patient's questions and concerns were addressed.
Bactrim Counseling:  I discussed with the patient the risks of sulfa antibiotics including but not limited to GI upset, allergic reaction, drug rash, diarrhea, dizziness, photosensitivity, and yeast infections.  Rarely, more serious reactions can occur including but not limited to aplastic anemia, agranulocytosis, methemoglobinemia, blood dyscrasias, liver or kidney failure, lung infiltrates or desquamative/blistering drug rashes.
Erythromycin Pregnancy And Lactation Text: This medication is Pregnancy Category B and is considered safe during pregnancy. It is also excreted in breast milk.
Dapsone Counseling: I discussed with the patient the risks of dapsone including but not limited to hemolytic anemia, agranulocytosis, rashes, methemoglobinemia, kidney failure, peripheral neuropathy, headaches, GI upset, and liver toxicity.  Patients who start dapsone require monitoring including baseline LFTs and weekly CBCs for the first month, then every month thereafter.  The patient verbalized understanding of the proper use and possible adverse effects of dapsone.  All of the patient's questions and concerns were addressed.
Tazorac Pregnancy And Lactation Text: This medication is not safe during pregnancy. It is unknown if this medication is excreted in breast milk.
High Dose Vitamin A Pregnancy And Lactation Text: High dose vitamin A therapy is contraindicated during pregnancy and breast feeding.
Spironolactone Counseling: Patient advised regarding risks of diarrhea, abdominal pain, hyperkalemia, birth defects (for female patients), liver toxicity and renal toxicity. The patient may need blood work to monitor liver and kidney function and potassium levels while on therapy. The patient verbalized understanding of the proper use and possible adverse effects of spironolactone.  All of the patient's questions and concerns were addressed.
Minocycline Pregnancy And Lactation Text: This medication is Pregnancy Category D and not consider safe during pregnancy. It is also excreted in breast milk.
Doxycycline Counseling:  Patient counseled regarding possible photosensitivity and increased risk for sunburn.  Patient instructed to avoid sunlight, if possible.  When exposed to sunlight, patients should wear protective clothing, sunglasses, and sunscreen.  The patient was instructed to call the office immediately if the following severe adverse effects occur:  hearing changes, easy bruising/bleeding, severe headache, or vision changes.  The patient verbalized understanding of the proper use and possible adverse effects of doxycycline.  All of the patient's questions and concerns were addressed.
Topical Retinoid counseling:  Patient advised to apply a pea-sized amount only at bedtime and wait 30 minutes after washing their face before applying.  If too drying, patient may add a non-comedogenic moisturizer. The patient verbalized understanding of the proper use and possible adverse effects of retinoids.  All of the patient's questions and concerns were addressed.
Bactrim Pregnancy And Lactation Text: This medication is Pregnancy Category D and is known to cause fetal risk.  It is also excreted in breast milk.
Use Enhanced Medication Counseling?: No
Spironolactone Pregnancy And Lactation Text: This medication can cause feminization of the male fetus and should be avoided during pregnancy. The active metabolite is also found in breast milk.
Dapsone Pregnancy And Lactation Text: This medication is Pregnancy Category C and is not considered safe during pregnancy or breast feeding.
Isotretinoin Counseling: Patient should get monthly blood tests, not donate blood, not drive at night if vision affected, not share medication, and not undergo elective surgery for 6 months after tx completed. Side effects reviewed, pt to contact office should one occur.
Azithromycin Counseling:  I discussed with the patient the risks of azithromycin including but not limited to GI upset, allergic reaction, drug rash, diarrhea, and yeast infections.
Topical Sulfur Applications Counseling: Topical Sulfur Counseling: Patient counseled that this medication may cause skin irritation or allergic reactions.  In the event of skin irritation, the patient was advised to reduce the amount of the drug applied or use it less frequently.   The patient verbalized understanding of the proper use and possible adverse effects of topical sulfur application.  All of the patient's questions and concerns were addressed.
Doxycycline Pregnancy And Lactation Text: This medication is Pregnancy Category D and not consider safe during pregnancy. It is also excreted in breast milk but is considered safe for shorter treatment courses.
Sarecycline Counseling: Patient advised regarding possible photosensitivity and discoloration of the teeth, skin, lips, tongue and gums.  Patient instructed to avoid sunlight, if possible.  When exposed to sunlight, patients should wear protective clothing, sunglasses, and sunscreen.  The patient was instructed to call the office immediately if the following severe adverse effects occur:  hearing changes, easy bruising/bleeding, severe headache, or vision changes.  The patient verbalized understanding of the proper use and possible adverse effects of sarecycline.  All of the patient's questions and concerns were addressed.
Birth Control Pills Counseling: Birth Control Pill Counseling: I discussed with the patient the potential side effects of OCPs including but not limited to increased risk of stroke, heart attack, thrombophlebitis, deep venous thrombosis, hepatic adenomas, breast changes, GI upset, headaches, and depression.  The patient verbalized understanding of the proper use and possible adverse effects of OCPs. All of the patient's questions and concerns were addressed.
Tetracycline Counseling: Patient counseled regarding possible photosensitivity and increased risk for sunburn.  Patient instructed to avoid sunlight, if possible.  When exposed to sunlight, patients should wear protective clothing, sunglasses, and sunscreen.  The patient was instructed to call the office immediately if the following severe adverse effects occur:  hearing changes, easy bruising/bleeding, severe headache, or vision changes.  The patient verbalized understanding of the proper use and possible adverse effects of tetracycline.  All of the patient's questions and concerns were addressed. Patient understands to avoid pregnancy while on therapy due to potential birth defects.
Topical Retinoid Pregnancy And Lactation Text: This medication is Pregnancy Category C. It is unknown if this medication is excreted in breast milk.
Isotretinoin Pregnancy And Lactation Text: This medication is Pregnancy Category X and is considered extremely dangerous during pregnancy. It is unknown if it is excreted in breast milk.
Topical Clindamycin Pregnancy And Lactation Text: This medication is Pregnancy Category B and is considered safe during pregnancy. It is unknown if it is excreted in breast milk.
Topical Sulfur Applications Pregnancy And Lactation Text: This medication is Pregnancy Category C and has an unknown safety profile during pregnancy. It is unknown if this topical medication is excreted in breast milk.
Benzoyl Peroxide Counseling: Patient counseled that medicine may cause skin irritation and bleach clothing.  In the event of skin irritation, the patient was advised to reduce the amount of the drug applied or use it less frequently.   The patient verbalized understanding of the proper use and possible adverse effects of benzoyl peroxide.  All of the patient's questions and concerns were addressed.
Azithromycin Pregnancy And Lactation Text: This medication is considered safe during pregnancy and is also secreted in breast milk.
Erythromycin Counseling:  I discussed with the patient the risks of erythromycin including but not limited to GI upset, allergic reaction, drug rash, diarrhea, increase in liver enzymes, and yeast infections.
High Dose Vitamin A Counseling: Side effects reviewed, pt to contact office should one occur.
Tazorac Counseling:  Patient advised that medication is irritating and drying.  Patient may need to apply sparingly and wash off after an hour before eventually leaving it on overnight.  The patient verbalized understanding of the proper use and possible adverse effects of tazorac.  All of the patient's questions and concerns were addressed.
Birth Control Pills Pregnancy And Lactation Text: This medication should be avoided if pregnant and for the first 30 days post-partum.
Detail Level: Simple
No

## 2024-12-09 ENCOUNTER — APPOINTMENT (OUTPATIENT)
Dept: OBGYN | Facility: CLINIC | Age: 34
End: 2024-12-09

## 2024-12-19 ENCOUNTER — NON-APPOINTMENT (OUTPATIENT)
Age: 34
End: 2024-12-19

## 2025-01-14 ENCOUNTER — APPOINTMENT (OUTPATIENT)
Dept: OBGYN | Facility: CLINIC | Age: 35
End: 2025-01-14
Payer: COMMERCIAL

## 2025-01-14 VITALS
SYSTOLIC BLOOD PRESSURE: 107 MMHG | WEIGHT: 119 LBS | BODY MASS INDEX: 23.36 KG/M2 | HEIGHT: 60 IN | DIASTOLIC BLOOD PRESSURE: 70 MMHG

## 2025-01-14 PROCEDURE — 0503F POSTPARTUM CARE VISIT: CPT

## 2025-02-18 NOTE — OB NEONATOLOGY/PEDIATRICIAN DELIVERY SUMMARY - NSPEDSCALLREASON_OBGYN_ALL_OB
Other
You can access the FollowMyHealth Patient Portal offered by [facility] by registering at the following website: [email or web address]. By joining [employer]’s FollowMyHealth portal, you will also be able to view your health information using other applications (apps) compatible with our system.

## 2025-04-30 PROCEDURE — 90834 PSYTX W PT 45 MINUTES: CPT | Mod: 93

## 2025-06-23 ENCOUNTER — APPOINTMENT (OUTPATIENT)
Dept: OBGYN | Facility: CLINIC | Age: 35
End: 2025-06-23

## 2025-07-14 ENCOUNTER — APPOINTMENT (OUTPATIENT)
Age: 35
End: 2025-07-14

## 2025-09-10 ENCOUNTER — APPOINTMENT (OUTPATIENT)
Dept: OBGYN | Facility: CLINIC | Age: 35
End: 2025-09-10
Payer: COMMERCIAL

## 2025-09-10 VITALS
BODY MASS INDEX: 22.78 KG/M2 | DIASTOLIC BLOOD PRESSURE: 68 MMHG | WEIGHT: 116 LBS | SYSTOLIC BLOOD PRESSURE: 104 MMHG | HEIGHT: 60 IN

## 2025-09-10 DIAGNOSIS — R10.2 PELVIC AND PERINEAL PAIN: ICD-10-CM

## 2025-09-10 PROCEDURE — 99213 OFFICE O/P EST LOW 20 MIN: CPT

## 2025-09-10 RX ORDER — SULFAMETHOXAZOLE AND TRIMETHOPRIM 800; 160 MG/1; MG/1
800-160 TABLET ORAL
Qty: 6 | Refills: 0 | Status: ACTIVE | COMMUNITY
Start: 2025-09-10 | End: 1900-01-01

## 2025-09-12 LAB
BACTERIA UR CULT: NORMAL
BV BACTERIA RRNA VAG QL NAA+PROBE: NOT DETECTED
C GLABRATA RNA VAG QL NAA+PROBE: NOT DETECTED
CANDIDA RRNA VAG QL PROBE: NOT DETECTED
T VAGINALIS RRNA SPEC QL NAA+PROBE: NOT DETECTED

## (undated) DEVICE — GLV 6.5 PROTEXIS W HYDROGEL

## (undated) DEVICE — TUBING RAPIDVAC SMOKE EVACUATOR .25" X 10FT

## (undated) DEVICE — PACK RHINOPLASTY

## (undated) DEVICE — WARMING BLANKET LOWER ADULT

## (undated) DEVICE — SLV COMPRESSION KNEE MED

## (undated) DEVICE — PETRI DISH MED 3.5"

## (undated) DEVICE — SOL ANTI FOG

## (undated) DEVICE — ELCTR BOVIE SUCTION 8FR 6"

## (undated) DEVICE — SYR LUER LOK 5CC

## (undated) DEVICE — GLV 7.5 PROTEXIS (WHITE)

## (undated) DEVICE — COTTONBALL LG

## (undated) DEVICE — GOWN ROYAL SILK XL